# Patient Record
Sex: MALE | Race: WHITE | NOT HISPANIC OR LATINO | Employment: STUDENT | URBAN - METROPOLITAN AREA
[De-identification: names, ages, dates, MRNs, and addresses within clinical notes are randomized per-mention and may not be internally consistent; named-entity substitution may affect disease eponyms.]

---

## 2022-11-02 ENCOUNTER — APPOINTMENT (OUTPATIENT)
Dept: CT IMAGING | Facility: CLINIC | Age: 22
End: 2022-11-02
Attending: EMERGENCY MEDICINE

## 2022-11-02 ENCOUNTER — HOSPITAL ENCOUNTER (OUTPATIENT)
Facility: CLINIC | Age: 22
Setting detail: OBSERVATION
Discharge: HOME | End: 2022-11-03
Attending: EMERGENCY MEDICINE | Admitting: NURSE PRACTITIONER
Payer: COMMERCIAL

## 2022-11-02 DIAGNOSIS — Z20.822 CONTACT WITH AND (SUSPECTED) EXPOSURE TO COVID-19: ICD-10-CM

## 2022-11-02 DIAGNOSIS — N20.0 KIDNEY STONE: ICD-10-CM

## 2022-11-02 DIAGNOSIS — N13.2 URETERAL STONE WITH HYDRONEPHROSIS: ICD-10-CM

## 2022-11-02 DIAGNOSIS — D72.829 LEUKOCYTOSIS, UNSPECIFIED TYPE: ICD-10-CM

## 2022-11-02 LAB
ALBUMIN SERPL BCG-MCNC: 4.4 G/DL (ref 3.5–5.2)
ALBUMIN UR-MCNC: 70 MG/DL
ALP SERPL-CCNC: 54 U/L (ref 35–129)
ALT SERPL W P-5'-P-CCNC: 22 U/L (ref 10–50)
ANION GAP SERPL CALCULATED.3IONS-SCNC: 13 MMOL/L (ref 7–15)
APPEARANCE UR: ABNORMAL
AST SERPL W P-5'-P-CCNC: 48 U/L (ref 10–50)
BILIRUB SERPL-MCNC: 0.3 MG/DL
BILIRUB UR QL STRIP: NEGATIVE
BUN SERPL-MCNC: 9.7 MG/DL (ref 6–20)
CALCIUM SERPL-MCNC: 9.4 MG/DL (ref 8.6–10)
CAOX CRY #/AREA URNS HPF: ABNORMAL /HPF
CHLORIDE SERPL-SCNC: 105 MMOL/L (ref 98–107)
COLOR UR AUTO: ABNORMAL
CREAT SERPL-MCNC: 0.65 MG/DL (ref 0.51–1.17)
DEPRECATED HCO3 PLAS-SCNC: 20 MMOL/L (ref 22–29)
GFR SERPL CREATININE-BSD FRML MDRD: >90 ML/MIN/1.73M2
GLUCOSE SERPL-MCNC: 90 MG/DL (ref 70–99)
GLUCOSE UR STRIP-MCNC: NEGATIVE MG/DL
HGB UR QL STRIP: ABNORMAL
HOLD SPECIMEN: NORMAL
HOLD SPECIMEN: NORMAL
KETONES UR STRIP-MCNC: NEGATIVE MG/DL
LEUKOCYTE ESTERASE UR QL STRIP: NEGATIVE
LIPASE SERPL-CCNC: 69 U/L (ref 13–60)
MUCOUS THREADS #/AREA URNS LPF: PRESENT /LPF
NITRATE UR QL: NEGATIVE
PH UR STRIP: 5.5 [PH] (ref 5–7)
POTASSIUM SERPL-SCNC: 4.4 MMOL/L (ref 3.4–5.3)
PROT SERPL-MCNC: 7.2 G/DL (ref 6.4–8.3)
RBC URINE: >182 /HPF
SODIUM SERPL-SCNC: 138 MMOL/L (ref 136–145)
SP GR UR STRIP: 1.03 (ref 1–1.03)
UROBILINOGEN UR STRIP-MCNC: NORMAL MG/DL
WBC URINE: 2 /HPF

## 2022-11-02 PROCEDURE — 99285 EMERGENCY DEPT VISIT HI MDM: CPT | Performed by: EMERGENCY MEDICINE

## 2022-11-02 PROCEDURE — 83690 ASSAY OF LIPASE: CPT | Performed by: EMERGENCY MEDICINE

## 2022-11-02 PROCEDURE — 74176 CT ABD & PELVIS W/O CONTRAST: CPT

## 2022-11-02 PROCEDURE — 87491 CHLMYD TRACH DNA AMP PROBE: CPT | Performed by: EMERGENCY MEDICINE

## 2022-11-02 PROCEDURE — 99285 EMERGENCY DEPT VISIT HI MDM: CPT | Mod: 25 | Performed by: EMERGENCY MEDICINE

## 2022-11-02 PROCEDURE — C9803 HOPD COVID-19 SPEC COLLECT: HCPCS | Performed by: EMERGENCY MEDICINE

## 2022-11-02 PROCEDURE — 81001 URINALYSIS AUTO W/SCOPE: CPT | Performed by: EMERGENCY MEDICINE

## 2022-11-02 PROCEDURE — 250N000013 HC RX MED GY IP 250 OP 250 PS 637: Performed by: EMERGENCY MEDICINE

## 2022-11-02 PROCEDURE — 36415 COLL VENOUS BLD VENIPUNCTURE: CPT | Performed by: EMERGENCY MEDICINE

## 2022-11-02 PROCEDURE — 87591 N.GONORRHOEAE DNA AMP PROB: CPT | Performed by: EMERGENCY MEDICINE

## 2022-11-02 PROCEDURE — 80053 COMPREHEN METABOLIC PANEL: CPT | Performed by: EMERGENCY MEDICINE

## 2022-11-02 PROCEDURE — 74176 CT ABD & PELVIS W/O CONTRAST: CPT | Mod: 26 | Performed by: RADIOLOGY

## 2022-11-02 RX ORDER — OXYCODONE HYDROCHLORIDE 5 MG/1
5 TABLET ORAL ONCE
Status: COMPLETED | OUTPATIENT
Start: 2022-11-02 | End: 2022-11-02

## 2022-11-02 RX ADMIN — OXYCODONE HYDROCHLORIDE 5 MG: 5 TABLET ORAL at 23:26

## 2022-11-02 ASSESSMENT — ACTIVITIES OF DAILY LIVING (ADL): ADLS_ACUITY_SCORE: 35

## 2022-11-03 VITALS
SYSTOLIC BLOOD PRESSURE: 132 MMHG | WEIGHT: 175 LBS | HEIGHT: 71 IN | OXYGEN SATURATION: 97 % | HEART RATE: 86 BPM | RESPIRATION RATE: 16 BRPM | DIASTOLIC BLOOD PRESSURE: 62 MMHG | TEMPERATURE: 97.9 F | BODY MASS INDEX: 24.5 KG/M2

## 2022-11-03 LAB
ANION GAP SERPL CALCULATED.3IONS-SCNC: 14 MMOL/L (ref 7–15)
BASOPHILS # BLD AUTO: 0 10E3/UL (ref 0–0.2)
BASOPHILS # BLD AUTO: 0 10E3/UL (ref 0–0.2)
BASOPHILS NFR BLD AUTO: 0 %
BASOPHILS NFR BLD AUTO: 0 %
BUN SERPL-MCNC: 12.7 MG/DL (ref 6–20)
CALCIUM SERPL-MCNC: 9.2 MG/DL (ref 8.6–10)
CHLORIDE SERPL-SCNC: 107 MMOL/L (ref 98–107)
CREAT SERPL-MCNC: 0.76 MG/DL (ref 0.51–1.17)
DEPRECATED HCO3 PLAS-SCNC: 18 MMOL/L (ref 22–29)
EOSINOPHIL # BLD AUTO: 0 10E3/UL (ref 0–0.7)
EOSINOPHIL # BLD AUTO: 0 10E3/UL (ref 0–0.7)
EOSINOPHIL NFR BLD AUTO: 0 %
EOSINOPHIL NFR BLD AUTO: 0 %
ERYTHROCYTE [DISTWIDTH] IN BLOOD BY AUTOMATED COUNT: 12.7 % (ref 10–15)
ERYTHROCYTE [DISTWIDTH] IN BLOOD BY AUTOMATED COUNT: 12.8 % (ref 10–15)
GFR SERPL CREATININE-BSD FRML MDRD: >90 ML/MIN/1.73M2
GLUCOSE SERPL-MCNC: 141 MG/DL (ref 70–99)
HCT VFR BLD AUTO: 39.3 % (ref 35–53)
HCT VFR BLD AUTO: 41.2 % (ref 35–53)
HGB BLD-MCNC: 12.9 G/DL (ref 11.7–17.7)
HGB BLD-MCNC: 13.8 G/DL (ref 11.7–17.7)
HOLD SPECIMEN: NORMAL
IMM GRANULOCYTES # BLD: 0.1 10E3/UL
IMM GRANULOCYTES # BLD: 0.1 10E3/UL
IMM GRANULOCYTES NFR BLD: 1 %
IMM GRANULOCYTES NFR BLD: 1 %
LACTATE SERPL-SCNC: 1.5 MMOL/L (ref 0.7–2)
LYMPHOCYTES # BLD AUTO: 1.4 10E3/UL (ref 0.8–5.3)
LYMPHOCYTES # BLD AUTO: 1.9 10E3/UL (ref 0.8–5.3)
LYMPHOCYTES NFR BLD AUTO: 10 %
LYMPHOCYTES NFR BLD AUTO: 17 %
MCH RBC QN AUTO: 30.5 PG (ref 26.5–33)
MCH RBC QN AUTO: 30.7 PG (ref 26.5–33)
MCHC RBC AUTO-ENTMCNC: 32.8 G/DL (ref 31.5–36.5)
MCHC RBC AUTO-ENTMCNC: 33.5 G/DL (ref 31.5–36.5)
MCV RBC AUTO: 92 FL (ref 78–100)
MCV RBC AUTO: 93 FL (ref 78–100)
MONOCYTES # BLD AUTO: 0.5 10E3/UL (ref 0–1.3)
MONOCYTES # BLD AUTO: 0.6 10E3/UL (ref 0–1.3)
MONOCYTES NFR BLD AUTO: 4 %
MONOCYTES NFR BLD AUTO: 6 %
NEUTROPHILS # BLD AUTO: 11.4 10E3/UL (ref 1.6–8.3)
NEUTROPHILS # BLD AUTO: 8.5 10E3/UL (ref 1.6–8.3)
NEUTROPHILS NFR BLD AUTO: 76 %
NEUTROPHILS NFR BLD AUTO: 85 %
NRBC # BLD AUTO: 0 10E3/UL
NRBC # BLD AUTO: 0 10E3/UL
NRBC BLD AUTO-RTO: 0 /100
NRBC BLD AUTO-RTO: 0 /100
PLATELET # BLD AUTO: 247 10E3/UL (ref 150–450)
PLATELET # BLD AUTO: 257 10E3/UL (ref 150–450)
POTASSIUM SERPL-SCNC: 3.9 MMOL/L (ref 3.4–5.3)
RBC # BLD AUTO: 4.23 10E6/UL (ref 3.8–5.9)
RBC # BLD AUTO: 4.5 10E6/UL (ref 3.8–5.9)
SARS-COV-2 RNA RESP QL NAA+PROBE: NEGATIVE
SODIUM SERPL-SCNC: 139 MMOL/L (ref 136–145)
WBC # BLD AUTO: 11 10E3/UL (ref 4–11)
WBC # BLD AUTO: 13.3 10E3/UL (ref 4–11)

## 2022-11-03 PROCEDURE — 99207 PR SERVICE NOT STAFFED W/SUPERV PROV: CPT | Performed by: STUDENT IN AN ORGANIZED HEALTH CARE EDUCATION/TRAINING PROGRAM

## 2022-11-03 PROCEDURE — 96375 TX/PRO/DX INJ NEW DRUG ADDON: CPT

## 2022-11-03 PROCEDURE — 250N000011 HC RX IP 250 OP 636: Performed by: EMERGENCY MEDICINE

## 2022-11-03 PROCEDURE — 36415 COLL VENOUS BLD VENIPUNCTURE: CPT | Performed by: NURSE PRACTITIONER

## 2022-11-03 PROCEDURE — 85004 AUTOMATED DIFF WBC COUNT: CPT | Performed by: NURSE PRACTITIONER

## 2022-11-03 PROCEDURE — 96376 TX/PRO/DX INJ SAME DRUG ADON: CPT

## 2022-11-03 PROCEDURE — 96374 THER/PROPH/DIAG INJ IV PUSH: CPT

## 2022-11-03 PROCEDURE — 99234 HOSP IP/OBS SM DT SF/LOW 45: CPT | Mod: FS | Performed by: EMERGENCY MEDICINE

## 2022-11-03 PROCEDURE — 96361 HYDRATE IV INFUSION ADD-ON: CPT

## 2022-11-03 PROCEDURE — 258N000003 HC RX IP 258 OP 636: Performed by: NURSE PRACTITIONER

## 2022-11-03 PROCEDURE — 250N000013 HC RX MED GY IP 250 OP 250 PS 637: Performed by: NURSE PRACTITIONER

## 2022-11-03 PROCEDURE — 85025 COMPLETE CBC W/AUTO DIFF WBC: CPT | Performed by: EMERGENCY MEDICINE

## 2022-11-03 PROCEDURE — 36415 COLL VENOUS BLD VENIPUNCTURE: CPT | Performed by: EMERGENCY MEDICINE

## 2022-11-03 PROCEDURE — 250N000011 HC RX IP 250 OP 636: Performed by: NURSE PRACTITIONER

## 2022-11-03 PROCEDURE — G0378 HOSPITAL OBSERVATION PER HR: HCPCS

## 2022-11-03 PROCEDURE — U0005 INFEC AGEN DETEC AMPLI PROBE: HCPCS | Performed by: EMERGENCY MEDICINE

## 2022-11-03 PROCEDURE — 82310 ASSAY OF CALCIUM: CPT | Performed by: NURSE PRACTITIONER

## 2022-11-03 PROCEDURE — 83605 ASSAY OF LACTIC ACID: CPT | Performed by: EMERGENCY MEDICINE

## 2022-11-03 RX ORDER — ONDANSETRON 2 MG/ML
4 INJECTION INTRAMUSCULAR; INTRAVENOUS EVERY 4 HOURS PRN
Status: DISCONTINUED | OUTPATIENT
Start: 2022-11-03 | End: 2022-11-03 | Stop reason: HOSPADM

## 2022-11-03 RX ORDER — ACETAMINOPHEN 325 MG/1
975 TABLET ORAL EVERY 6 HOURS
Status: DISCONTINUED | OUTPATIENT
Start: 2022-11-03 | End: 2022-11-03 | Stop reason: HOSPADM

## 2022-11-03 RX ORDER — TAMSULOSIN HYDROCHLORIDE 0.4 MG/1
0.4 CAPSULE ORAL DAILY
Qty: 30 CAPSULE | Refills: 0 | Status: ON HOLD | OUTPATIENT
Start: 2022-11-04 | End: 2022-11-16

## 2022-11-03 RX ORDER — ACETAMINOPHEN 325 MG/1
975 TABLET ORAL EVERY 6 HOURS
COMMUNITY
Start: 2022-11-03 | End: 2022-11-15

## 2022-11-03 RX ORDER — POLYETHYLENE GLYCOL 3350 17 G/17G
17 POWDER, FOR SOLUTION ORAL DAILY PRN
Status: DISCONTINUED | OUTPATIENT
Start: 2022-11-03 | End: 2022-11-03 | Stop reason: HOSPADM

## 2022-11-03 RX ORDER — IBUPROFEN 200 MG
600 TABLET ORAL EVERY 6 HOURS PRN
COMMUNITY
Start: 2022-11-03 | End: 2022-11-15

## 2022-11-03 RX ORDER — POLYETHYLENE GLYCOL 3350 17 G/17G
17 POWDER, FOR SOLUTION ORAL DAILY
Status: DISCONTINUED | OUTPATIENT
Start: 2022-11-03 | End: 2022-11-03

## 2022-11-03 RX ORDER — KETOROLAC TROMETHAMINE 15 MG/ML
15 INJECTION, SOLUTION INTRAMUSCULAR; INTRAVENOUS ONCE
Status: COMPLETED | OUTPATIENT
Start: 2022-11-03 | End: 2022-11-03

## 2022-11-03 RX ORDER — KETOROLAC TROMETHAMINE 15 MG/ML
15-30 INJECTION, SOLUTION INTRAMUSCULAR; INTRAVENOUS EVERY 6 HOURS
Status: DISCONTINUED | OUTPATIENT
Start: 2022-11-03 | End: 2022-11-03 | Stop reason: HOSPADM

## 2022-11-03 RX ORDER — KETOROLAC TROMETHAMINE 15 MG/ML
15 INJECTION, SOLUTION INTRAMUSCULAR; INTRAVENOUS EVERY 6 HOURS
Status: DISCONTINUED | OUTPATIENT
Start: 2022-11-03 | End: 2022-11-03

## 2022-11-03 RX ORDER — ONDANSETRON 2 MG/ML
4 INJECTION INTRAMUSCULAR; INTRAVENOUS ONCE
Status: COMPLETED | OUTPATIENT
Start: 2022-11-03 | End: 2022-11-03

## 2022-11-03 RX ORDER — TAMSULOSIN HYDROCHLORIDE 0.4 MG/1
0.4 CAPSULE ORAL DAILY
Status: DISCONTINUED | OUTPATIENT
Start: 2022-11-03 | End: 2022-11-03 | Stop reason: HOSPADM

## 2022-11-03 RX ORDER — ONDANSETRON 4 MG/1
4 TABLET, ORALLY DISINTEGRATING ORAL EVERY 6 HOURS PRN
Qty: 10 TABLET | Refills: 0 | Status: SHIPPED | OUTPATIENT
Start: 2022-11-03 | End: 2022-11-15

## 2022-11-03 RX ORDER — SODIUM CHLORIDE 9 MG/ML
INJECTION, SOLUTION INTRAVENOUS CONTINUOUS
Status: DISCONTINUED | OUTPATIENT
Start: 2022-11-03 | End: 2022-11-03 | Stop reason: HOSPADM

## 2022-11-03 RX ADMIN — SODIUM CHLORIDE: 9 INJECTION, SOLUTION INTRAVENOUS at 06:03

## 2022-11-03 RX ADMIN — ONDANSETRON 4 MG: 2 INJECTION INTRAMUSCULAR; INTRAVENOUS at 01:29

## 2022-11-03 RX ADMIN — TAMSULOSIN HYDROCHLORIDE 0.4 MG: 0.4 CAPSULE ORAL at 09:04

## 2022-11-03 RX ADMIN — KETOROLAC TROMETHAMINE 15 MG: 15 INJECTION, SOLUTION INTRAMUSCULAR; INTRAVENOUS at 08:30

## 2022-11-03 RX ADMIN — KETOROLAC TROMETHAMINE 15 MG: 15 INJECTION, SOLUTION INTRAMUSCULAR; INTRAVENOUS at 01:30

## 2022-11-03 ASSESSMENT — ACTIVITIES OF DAILY LIVING (ADL)
ADLS_ACUITY_SCORE: 35

## 2022-11-03 NOTE — PROCEDURES
Procedure note:    Peripheral Venous Access with US Guidance   Limited Diagnostic Exam: Venipuncture requiring physician skill with ultrasound guidance    Performed by Audra Hartman CNP  Patient Identity confirmed: Yes  Risks, benefits and alternatives were discussed  Consent given by: patient   Indication for Exam: Vascular Access   Vein/Location: right AC  Normal Compressibility and Visualized Patency   Catheter Size: 18g  Number of Attempts: 1  Successful Catheter Insertion: no, labs (phlebotomy) was able to be performed and labs sent for analysis  Complications: yes, increased swelling around insertion site when flushed IV catheter, resistance felt. Removed PIV.

## 2022-11-03 NOTE — H&P
[facility]    History and Physical - Emergency Department Observation Unit     Date of Admission:  11/2/2022    Assessment & Plan      Regan Gibson is a 22 year old adult admitted on 11/2/2022. Kiran has no past medical history. Regan presents with abdominal pain, back pain and bloody urine.     #4 mm obstructing stone proximal right ureter resulting in minimal right hydronephrosis..  #Leukocytosis  Patient presents with acute right flank pain and noted blood in the urine which started suddenly today. Reported pain radiating to right testicle since hematuria started. No abnormal testicular swelling or abnormal position of testicle which has improved.  In the ED: Vitals:BP:139/83  Pulse: 82 Temp: 97.9 Resp: 17 SP02:100% Labs:Na 138, K 4.4 Cr 0.65, BUN 20 , , BG 69, WBC 13.3, Hgb 13.8, Plts 257, Chlamydia and Gonorrhoeae pending, Covid negative, UA with cloudy appearance, , Large blood , >182 RBC, Negative LE, Negative Nitrite.  Medications:  Ketorolac 15 mg IV x 1, Zofran 4 mg IV x 1, Oxycodone 5 mg PO x 1. Imaging: CT abd/pelvis: 4 mm obstructing stone in the proximal right ureter just below the UPJ resulting in minimal right hydronephrosis. Single 3 mm nonobstructing stone right kidney which is otherwise negative. Left kidney contains 4 nonobstructing stones  measuring up to 4 mm in diameter. No left-sided ureteral stones or hydronephrosis. Bladder is unremarkable Consults: none Plan: Admit to ED observation for pain management. Reports Oxycodone does not help, Toradol has been most helpful.   - Oxycodone 5-10 mg q 4 hours prn, Dilaudid 0.3-0.5 q 3 hours prn for breakthrough pain, Toradol 15 mg q 6 hours, Flomax daily, Scheduled Tylenol.   - Miralax prn  - Continuous IVF  - Strict I and O's  - Strain urine  - ADAT  - Antiemetics prn  - Repeat CBC and CMP in am     Diet:  ADAT   DVT Prophylaxis: Ambulate every shift  Alvarado Catheter: Not present  Central Lines:  "None  Cardiac Monitoring: None  Code Status:   Full    Disposition Plan      Expected Discharge Date: 11/04/2022                The patient's care was discussed with the Bedside Nurse, Patient and ED physician, Dr. Aceves.    IBRAHIMA Hurst CNP  _________________________________________________________________    Chief Complaint   Abdominal pain, back pain and blood in urine     History is obtained from the patient    History of Present Illness   Per ED note, \" Patient developed lower abdominal discomfort, low back discomfort followed by hematuria and a little bit of thicker blood clot.  Symptoms are a bit worse on the right side.  Pain is in low abdomen, little worse on right as well as low back. Does have some pain radiating to right testicle since hematuria started. No abnormal testicular swelling or abnormal position of testicle. No discharge.No fever or chills, no traumas, no known stones or anything like this previously. \"    Review of Systems    The 10 point Review of Systems is negative other than noted in the HPI or here. Lower abdominal pain, back pain and blood in urine.     Past Medical History    I have reviewed this patient's medical history and updated it with pertinent information if needed.   No past medical history on file.    Past Surgical History   I have reviewed this patient's surgical history and updated it with pertinent information if needed.  No past surgical history on file.    Social History   I have reviewed this patient's social history and updated it with pertinent information if needed.       Family History   Prior to Admission Medications   None     Allergies   Allergies   Allergen Reactions     Ibuprofen Nausea       Physical Exam   Vital Signs: Temp: 97.9  F (36.6  C) Temp src: Temporal BP: 139/83 Pulse: 82   Resp: 17 SpO2: 100 % O2 Device: None (Room air)    Weight: 175 lbs 0 oz    Constitutional: healthy, alert and no distress   Head: Normocephalic. No masses, lesions, " tenderness or abnormalities   Neck: Neck supple. No adenopathy. Thyroid symmetric, normal size,, Carotids without bruits.   ENT: ENT exam normal, no neck nodes or sinus tenderness   Cardiovascular: RRR. No murmurs, clicks gallops or rub   Respiratory: . Good diaphragmatic excursion. Lungs clear   Gastrointestinal: Abdomen soft,. BS normal. No masses, organomegaly.   : Deferred   Musculoskeletal: extremities normal- no gross deformities noted, gait normal and normal muscle tone   Skin: no suspicious lesions or rashes   Neurologic: Gait normal. Reflexes normal and symmetric. Sensation grossly WNL.   Psychiatric: mentation appears normal and affect normal/bright     Data   Data reviewed today: I reviewed all medications, new labs and imaging results over the last 24 hours.    Recent Labs   Lab 11/03/22 0119 11/02/22 2141   WBC 13.3*  --    HGB 13.8  --    MCV 92  --      --    NA  --  138   POTASSIUM  --  4.4   CHLORIDE  --  105   CO2  --  20*   BUN  --  9.7   CR  --  0.65   ANIONGAP  --  13   JOLIE  --  9.4   GLC  --  90   ALBUMIN  --  4.4   PROTTOTAL  --  7.2   BILITOTAL  --  0.3   ALKPHOS  --  54   ALT  --  22   AST  --  48   LIPASE  --  69*     Most Recent 3 CBC's:Recent Labs   Lab Test 11/03/22 0119   WBC 13.3*   HGB 13.8   MCV 92        Most Recent 3 BMP's:Recent Labs   Lab Test 11/02/22 2141      POTASSIUM 4.4   CHLORIDE 105   CO2 20*   BUN 9.7   CR 0.65   ANIONGAP 13   JOLIE 9.4   GLC 90     Most Recent 2 LFT's:Recent Labs   Lab Test 11/02/22 2141   AST 48   ALT 22   ALKPHOS 54   BILITOTAL 0.3     Recent Results (from the past 24 hour(s))   CT Abdomen Pelvis w/o Contrast    Narrative    EXAM: CT ABDOMEN PELVIS W/O CONTRAST  LOCATION: [facility]  DATE/TIME: 11/2/2022 10:36 PM    INDICATION: Flank pain, back pain, hematuria.  COMPARISON: None.  TECHNIQUE: CT scan of the abdomen and pelvis was performed without IV contrast. Multiplanar reformats  were obtained. Dose reduction techniques were used.  CONTRAST: None.    FINDINGS:   LOWER CHEST: Normal.    HEPATOBILIARY: Normal.    PANCREAS: Normal.    SPLEEN: Normal.    ADRENAL GLANDS: Normal.    KIDNEYS/BLADDER: 4 mm obstructing stone in the proximal right ureter just below the UPJ resulting in minimal right hydronephrosis. Single 3 mm nonobstructing stone right kidney which is otherwise negative. Left kidney contains 4 nonobstructing stones   measuring up to 4 mm in diameter. No left-sided ureteral stones or hydronephrosis. Bladder is unremarkable.    BOWEL: Normal.    LYMPH NODES: Normal.    VASCULATURE: Unremarkable.    PELVIC ORGANS: Normal.    MUSCULOSKELETAL: Normal.      Impression    IMPRESSION:   1.  4 mm obstructing stone proximal right ureter resulting in minimal right hydronephrosis.    2.  No other acute findings.    3.  Additional nonobstructing intrarenal calculi bilaterally as above.

## 2022-11-03 NOTE — CONSULTS
"Urology Consult History and Physical    Name: Regan \"Dana\" CLAUDIA Gibson    MRN: [id number]   YOB: 2000       We were asked to see Dana Gibson at the request of Dr. Barkley for evaluation and treatment of ureterolithiasis.        Chief Complaint:   Ureterolithiasis    History is obtained from the patient          History of Present Illness:   Regan Gibson is a 22 year old adult, otherwise healthy, who is visiting the [place] from [place] until 01/2023, who is being seen for evaluation of ureterolithiasis.      Per ED note, \" Patient developed lower abdominal discomfort, low back discomfort followed by hematuria and a little bit of thicker blood clot.  Symptoms are a bit worse on the right side.  Pain is in low abdomen, little worse on right as well as low back. Does have some pain radiating to right testicle since hematuria started. No abnormal testicular swelling or abnormal position of testicle. No discharge. No fever or chills, no traumas, no known stones or anything like this previously.\"    WBC 13.3 -> 11, Cr WNL, UA with RBC, no nitrites/leuk est/bacteria. CT scan with 4mm stone in R proximal ureter with mild upstream hydro, as well as bilateral small non-obstructing stones. Started on IVF, Flomax, pain control, given urine strainer.     Upon talking to patient this AM, her flank/abd pain was much improved, had received one dose of Toradol around 1:30AM, and no further pain medications. Showed me urine strainer with small white speck that she thought might have been her stone. Denies personal history of stones, but her father has had multiple stones in the past.            Past Medical History:   No past medical history on file.         Past Surgical History:   No past surgical history on file.         Social History:     Social History     Tobacco Use     Smoking status: Not on file     Smokeless tobacco: Not on file   Substance Use Topics     Alcohol use: Not on file            Family History:   No " "family history on file.           Allergies:     Allergies   Allergen Reactions     Ibuprofen Nausea            Medications:     Current Facility-Administered Medications   Medication     acetaminophen (TYLENOL) tablet 975 mg     ketorolac (TORADOL) injection 15-30 mg     melatonin tablet 1 mg     ondansetron (ZOFRAN) injection 4 mg     polyethylene glycol (MIRALAX) Packet 17 g     prochlorperazine (COMPAZINE) injection 5 mg     sodium chloride 0.9% infusion     tamsulosin (FLOMAX) capsule 0.4 mg     No current outpatient medications on file.             Review of Systems:    ROS: 10 point ROS neg other than the symptoms noted above in the HPI.          Physical Exam:     Patient Vitals for the past 24 hrs:   BP Temp Temp src Pulse Resp SpO2 Height Weight   11/03/22 0136 139/83 -- -- 82 17 100 % -- --   11/02/22 2136 123/71 97.9  F (36.6  C) Temporal 85 18 100 % 1.803 m (5' 11\") 79.4 kg (175 lb)     General: age-appropriate appearing adult in NAD  HEENT: Head AT/NC, EOMI, CN Grossly intact  Lungs: no respiratory distress, or pursed lip breathing  Back: no CVAT bilaterally.  Abdomen: soft, non-distended, non-tender  Musculoskeletal: extremities normal, no peripheral edema  Skin: no suspicious lesions or rashes  Neuro: Alert, oriented, speech and mentation normal  Psych: affect and mood normal          Data:   All laboratory data reviewed:    Recent Labs   Lab 11/03/22  0119   WBC 13.3*   HGB 13.8        Recent Labs   Lab 11/02/22  2141      POTASSIUM 4.4   CHLORIDE 105   CO2 20*   BUN 9.7   CR 0.65   GLC 90   JOLIE 9.4     Recent Labs   Lab 11/02/22  2143   COLOR Orange*   APPEARANCE Cloudy*   URINEGLC Negative   URINEBILI Negative   URINEKETONE Negative   SG 1.027   URINEPH 5.5   PROTEIN 70*   NITRITE Negative   LEUKEST Negative   RBCU >182*   WBCU 2     All pertinent imaging reviewed:  CT A/P non-con -   IMPRESSION:   1.  4 mm obstructing stone proximal right ureter resulting in minimal right " hydronephrosis.     2.  No other acute findings.     3.  Additional nonobstructing intrarenal calculi bilaterally as above.         Impression and Plan:   Impression:   22yoF with R proximal ureteral stone, 4mm in size, with minimal R hydro. Non-toxic appearing, not infected, pain controlled. Thinks she may have passed the stone since admission overnight.     Reviewed MET as recommended treatment for 4mm stone, including hydration, pain control, Flomax, and urine straining. Discussed that, for patients discharged on MET, the typical recommendation is to obtain f/u imaging to check for stone passage at some point in the future. Patient stated that she would prefer to do this once she returns to [place] in January, as she does not have healthcare coverage here. Reviewed return precautions and briefly reviewed other surgical treatment options for stones.       Plan:   - medical expulsive therapy   - strict return precautions  - f/u imaging (renal ultrasound) in 01/2023 when she returns home      Discussed with chief resident and staff on call, Dr. Oswald.     Inessa Sarkar MD  Urology Resident, PGY-2

## 2022-11-03 NOTE — DISCHARGE SUMMARY
Discharge Summary    Regan Gibson MRN# [id number]   YOB: 2000 Age: 22 year old     Date of Admission:  11/2/2022  Date of Discharge:  11/3/2022 12:38 PM  Admitting Physician:  IBRAHIMA Soliman CNP  Discharge Physician:  Gisselle Castillo PA-C  Discharging Service:  Internal Medicine     Primary Provider: No Ref-Primary, Physician          Discharge Diagnosis:   4 mm obstructing right proximal ureteral stone   Right hydronephrosis              Discharge Disposition:   Discharged to home           Condition on Discharge:   Discharge condition: Stable   Code status on discharge: Full Code           Procedures:   Imaging performed:   Abdomen CT             Discharge Medications:     Current Discharge Medication List      START taking these medications    Details   acetaminophen (TYLENOL) 325 MG tablet Take 3 tablets (975 mg) by mouth every 6 hours      ondansetron (ZOFRAN ODT) 4 MG ODT tab Take 1 tablet (4 mg) by mouth every 6 hours as needed for nausea  Qty: 10 tablet, Refills: 0    Associated Diagnoses: Kidney stone      tamsulosin (FLOMAX) 0.4 MG capsule Take 1 capsule (0.4 mg) by mouth daily for 30 days  Qty: 30 capsule, Refills: 0    Associated Diagnoses: Kidney stone         CONTINUE these medications which have NOT CHANGED    Details   ibuprofen (ADVIL/MOTRIN) 200 MG tablet Take 3 tablets (600 mg) by mouth every 6 hours as needed for pain    Comments: Take this medication with meals                   Consultations:   Consultation during this admission received from urology             Brief History of Illness:   Regan Gibson is a 22 year old adult admitted on 11/2/2022. Kiran has no past medical history. Regan presents with abdominal pain, back pain and bloody urine.           Hospital Course:   #4 mm obstructing stone proximal right ureter resulting in minimal right hydronephrosis..  #Leukocytosis, resolved    Patient presents with acute right flank pain and noted blood in the urine which  "started suddenly today. Reported pain radiating to right testicle since hematuria started. No abnormal testicular swelling or abnormal position of testicle which has improved.  In the ED: Vitals:BP:139/83  Pulse: 82 Temp: 97.9 Resp: 17 SP02:100% Labs:Na 138, K 4.4 Cr 0.65, BUN 20 , , BG 69, WBC 13.3, Hgb 13.8, Plts 257, Chlamydia and Gonorrhoeae pending, Covid negative, UA with cloudy appearance, , Large blood , >182 RBC, Negative LE, Negative Nitrite. CT abd/pelvis showed 4 mm obstructing stone in the proximal right ureter just below the UPJ resulting in minimal right hydronephrosis. Single 3 mm nonobstructing stone right kidney which is otherwise negative. Left kidney contains 4 nonobstructing stones  measuring up to 4 mm in diameter. No left-sided ureteral stones or hydronephrosis. Bladder is unremarkable. Patient feeling medically improved this morning. Rates pain 3/10. She is able to tolerate po. Leukocytosis resolved with recheck blood work this morning. No s&s of systemic infection process. Urology was consulted. No surgical intervention recommended given that the patient has minimal pain and is hemodynamically stable. They recommend conservative management. Patient discharged home with Flomax. Advised to continue to strain urine for stone. They also recommend f/u imaging to check for stone passage at some point when the patient returns home. Patient discharged in HD sable condition.   - Tylenol PRN  - Ibuprofen 600 mg q 6hr PRN  - Flomax 0.4 mg daily  - Continue to strain urine  - Zofran 4 mg ODT q 6hr prn                   Final Day of Progress before Discharge:       Physical Exam:  Blood pressure 132/62, pulse 86, temperature 97.9  F (36.6  C), temperature source Temporal, resp. rate 16, height 1.803 m (5' 11\"), weight 79.4 kg (175 lb), SpO2 97 %.    EXAM:  Physical Exam   Constitutional: Pt is oriented to person, place, and time.Pt appears well-developed and well-nourished.   HENT:   Head: Normocephalic " and atraumatic.   Eyes: Conjunctivae are normal. Pupils are equal, round, and reactive to light.   Neck: Normal range of motion. Neck supple.   Cardiovascular: Normal rate, regular rhythm, normal heart sounds and intact distal pulses.    Pulmonary/Chest: Effort normal and breath sounds normal. No respiratory distress. Pt has no wheezes. Pt has no rales  Abdominal: Soft. Bowel sounds are normal. Pt exhibits no distension and no mass. No tenderness. Pt has no rebound and no guarding.   Musculoskeletal: Normal range of motion. Pt exhibits no edema.   Neurological: Pt is alert and oriented to person, place, and time. Normal reflexes.   Skin: Skin is warm and dry. No rash noted.   Psychiatric: Pt has a normal mood and affect. Behavior is normal. Judgment and thought content normal.             Data:  All laboratory data reviewed             Significant Results:   None  Results for orders placed or performed during the hospital encounter of 11/02/22   CT Abdomen Pelvis w/o Contrast     Status: None    Narrative    EXAM: CT ABDOMEN PELVIS W/O CONTRAST  LOCATION: [facility]  DATE/TIME: 11/2/2022 10:36 PM    INDICATION: Flank pain, back pain, hematuria.  COMPARISON: None.  TECHNIQUE: CT scan of the abdomen and pelvis was performed without IV contrast. Multiplanar reformats were obtained. Dose reduction techniques were used.  CONTRAST: None.    FINDINGS:   LOWER CHEST: Normal.    HEPATOBILIARY: Normal.    PANCREAS: Normal.    SPLEEN: Normal.    ADRENAL GLANDS: Normal.    KIDNEYS/BLADDER: 4 mm obstructing stone in the proximal right ureter just below the UPJ resulting in minimal right hydronephrosis. Single 3 mm nonobstructing stone right kidney which is otherwise negative. Left kidney contains 4 nonobstructing stones   measuring up to 4 mm in diameter. No left-sided ureteral stones or hydronephrosis. Bladder is unremarkable.    BOWEL: Normal.    LYMPH NODES:  Normal.    VASCULATURE: Unremarkable.    PELVIC ORGANS: Normal.    MUSCULOSKELETAL: Normal.      Impression    IMPRESSION:   1.  4 mm obstructing stone proximal right ureter resulting in minimal right hydronephrosis.    2.  No other acute findings.    3.  Additional nonobstructing intrarenal calculi bilaterally as above.     Comprehensive metabolic panel     Status: Abnormal   Result Value Ref Range    Sodium 138 136 - 145 mmol/L    Potassium 4.4 3.4 - 5.3 mmol/L    Chloride 105 98 - 107 mmol/L    Carbon Dioxide (CO2) 20 (L) 22 - 29 mmol/L    Anion Gap 13 7 - 15 mmol/L    Urea Nitrogen 9.7 6.0 - 20.0 mg/dL    Creatinine 0.65 0.51 - 1.17 mg/dL    Calcium 9.4 8.6 - 10.0 mg/dL    Glucose 90 70 - 99 mg/dL    Alkaline Phosphatase 54 35 - 129 U/L    AST 48 10 - 50 U/L    ALT 22 10 - 50 U/L    Protein Total 7.2 6.4 - 8.3 g/dL    Albumin 4.4 3.5 - 5.2 g/dL    Bilirubin Total 0.3 <=1.2 mg/dL    GFR Estimate >90 >60 mL/min/1.73m2    Narrative    The sex of this patient cannot be reliably determined based on discrepancies in demographics (legal sex, sex assigned at birth, gender identity).  Both male and female reference ranges are provided where applicable.  Careful evaluation of the patient s results as compared to the gender specific reference intervals is required in this setting.    UA with Microscopic reflex to Culture     Status: Abnormal    Specimen: Urine, Midstream   Result Value Ref Range    Color Urine Orange (A) Colorless, Straw, Light Yellow, Yellow    Appearance Urine Cloudy (A) Clear    Glucose Urine Negative Negative mg/dL    Bilirubin Urine Negative Negative    Ketones Urine Negative Negative mg/dL    Specific Gravity Urine 1.027 1.003 - 1.035    Blood Urine Large (A) Negative    pH Urine 5.5 5.0 - 7.0    Protein Albumin Urine 70 (A) Negative mg/dL    Urobilinogen Urine Normal Normal, 2.0 mg/dL    Nitrite Urine Negative Negative    Leukocyte Esterase Urine Negative Negative    Mucus Urine Present (A) None  Seen /LPF    Calcium Oxalate Crystals Urine Few (A) None Seen /HPF    RBC Urine >182 (H) <=2 /HPF    WBC Urine 2 <=5 /HPF    Narrative    Urine Culture not indicated   [place] Draw     Status: None    Narrative    The following orders were created for panel order [place] Draw.  Procedure                               Abnormality         Status                     ---------                               -----------         ------                     Extra Blue Top Tube[[id number]]                                                         Extra Red Top Tube[[id number]]                               Final result               Extra Green Top (Lithium...[[id number]]                      Final result               Extra Purple Top Tube[[id number]]                            Final result                 Please view results for these tests on the individual orders.   CBC with platelets and differential     Status: Abnormal   Result Value Ref Range    WBC Count 13.3 (H) 4.0 - 11.0 10e3/uL    RBC Count 4.50 3.80 - 5.90 10e6/uL    Hemoglobin 13.8 11.7 - 17.7 g/dL    Hematocrit 41.2 35.0 - 53.0 %    MCV 92 78 - 100 fL    MCH 30.7 26.5 - 33.0 pg    MCHC 33.5 31.5 - 36.5 g/dL    RDW 12.7 10.0 - 15.0 %    Platelet Count 257 150 - 450 10e3/uL    % Neutrophils 85 %    % Lymphocytes 10 %    % Monocytes 4 %    % Eosinophils 0 %    % Basophils 0 %    % Immature Granulocytes 1 %    NRBCs per 100 WBC 0 <1 /100    Absolute Neutrophils 11.4 (H) 1.6 - 8.3 10e3/uL    Absolute Lymphocytes 1.4 0.8 - 5.3 10e3/uL    Absolute Monocytes 0.5 0.0 - 1.3 10e3/uL    Absolute Eosinophils 0.0 0.0 - 0.7 10e3/uL    Absolute Basophils 0.0 0.0 - 0.2 10e3/uL    Absolute Immature Granulocytes 0.1 <=0.4 10e3/uL    Absolute NRBCs 0.0 10e3/uL    Narrative    The sex of this patient cannot be reliably determined based on discrepancies in demographics (legal sex, sex assigned at birth, gender identity).  Both male and female reference ranges are provided where  applicable.  Careful evaluation of the patient s results as compared to the gender specific reference intervals is required in this setting.    Extra Red Top Tube     Status: None   Result Value Ref Range    Hold Specimen JIC    Extra Green Top (Lithium Heparin) Tube     Status: None   Result Value Ref Range    Hold Specimen JIC    Extra Purple Top Tube     Status: None   Result Value Ref Range    Hold Specimen JIC    Lactic acid whole blood     Status: Normal   Result Value Ref Range    Lactic Acid 1.5 0.7 - 2.0 mmol/L   Lipase     Status: Abnormal   Result Value Ref Range    Lipase 69 (H) 13 - 60 U/L   Asymptomatic COVID-19 Virus (Coronavirus) by PCR Nasopharyngeal     Status: Normal    Specimen: Nasopharyngeal; Swab   Result Value Ref Range    SARS CoV2 PCR Negative Negative    Narrative    Testing was performed using the [employer] Xpress SARS-CoV-2 Assay on the  Cepheid Gene-Xpert Instrument Systems. Additional information about  this Emergency Use Authorization (EUA) assay can be found via the Lab  Guide. This test should be ordered for the detection of SARS-CoV-2 in  individuals who meet SARS-CoV-2 clinical and/or epidemiological  criteria. Test performance is unknown in asymptomatic patients. This  test is for in vitro diagnostic use under the FDA EUA for  laboratories certified under CLIA to perform high complexity testing.  This test has not been FDA cleared or approved. A negative result  does not rule out the presence of PCR inhibitors in the specimen or  target RNA in concentration below the limit of detection for the  assay. The possibility of a false negative should be considered if  the patient's recent exposure or clinical presentation suggests  COVID-19. This test was validated by the [facility]. This laboratory is certified under  the Clinical Laboratory Improvement Amendments of 1988 (CLIA-88) as  qualified to perform high complexity laboratory  testing.     Basic metabolic panel     Status: Abnormal   Result Value Ref Range    Sodium 139 136 - 145 mmol/L    Potassium 3.9 3.4 - 5.3 mmol/L    Chloride 107 98 - 107 mmol/L    Carbon Dioxide (CO2) 18 (L) 22 - 29 mmol/L    Anion Gap 14 7 - 15 mmol/L    Urea Nitrogen 12.7 6.0 - 20.0 mg/dL    Creatinine 0.76 0.51 - 1.17 mg/dL    Calcium 9.2 8.6 - 10.0 mg/dL    Glucose 141 (H) 70 - 99 mg/dL    GFR Estimate >90 >60 mL/min/1.73m2    Narrative    The sex of this patient cannot be reliably determined based on discrepancies in demographics (legal sex, sex assigned at birth, gender identity).  Both male and female reference ranges are provided where applicable.  Careful evaluation of the patient s results as compared to the gender specific reference intervals is required in this setting.    CBC with platelets and differential     Status: Abnormal   Result Value Ref Range    WBC Count 11.0 4.0 - 11.0 10e3/uL    RBC Count 4.23 3.80 - 5.90 10e6/uL    Hemoglobin 12.9 11.7 - 17.7 g/dL    Hematocrit 39.3 35.0 - 53.0 %    MCV 93 78 - 100 fL    MCH 30.5 26.5 - 33.0 pg    MCHC 32.8 31.5 - 36.5 g/dL    RDW 12.8 10.0 - 15.0 %    Platelet Count 247 150 - 450 10e3/uL    % Neutrophils 76 %    % Lymphocytes 17 %    % Monocytes 6 %    % Eosinophils 0 %    % Basophils 0 %    % Immature Granulocytes 1 %    NRBCs per 100 WBC 0 <1 /100    Absolute Neutrophils 8.5 (H) 1.6 - 8.3 10e3/uL    Absolute Lymphocytes 1.9 0.8 - 5.3 10e3/uL    Absolute Monocytes 0.6 0.0 - 1.3 10e3/uL    Absolute Eosinophils 0.0 0.0 - 0.7 10e3/uL    Absolute Basophils 0.0 0.0 - 0.2 10e3/uL    Absolute Immature Granulocytes 0.1 <=0.4 10e3/uL    Absolute NRBCs 0.0 10e3/uL    Narrative    The sex of this patient cannot be reliably determined based on discrepancies in demographics (legal sex, sex assigned at birth, gender identity).  Both male and female reference ranges are provided where applicable.  Careful evaluation of the patient s results as compared to the gender  specific reference intervals is required in this setting.    CBC with platelets differential     Status: Abnormal    Narrative    The following orders were created for panel order CBC with platelets differential.  Procedure                               Abnormality         Status                     ---------                               -----------         ------                     CBC with platelets and d...[[id number]]  Abnormal            Final result                 Please view results for these tests on the individual orders.   CBC with platelets differential     Status: Abnormal    Narrative    The following orders were created for panel order CBC with platelets differential.  Procedure                               Abnormality         Status                     ---------                               -----------         ------                     CBC with platelets and d...[[id number]]  Abnormal            Final result                 Please view results for these tests on the individual orders.      Recent Results (from the past 48 hour(s))   CT Abdomen Pelvis w/o Contrast    Narrative    EXAM: CT ABDOMEN PELVIS W/O CONTRAST  LOCATION: [facility]  DATE/TIME: 11/2/2022 10:36 PM    INDICATION: Flank pain, back pain, hematuria.  COMPARISON: None.  TECHNIQUE: CT scan of the abdomen and pelvis was performed without IV contrast. Multiplanar reformats were obtained. Dose reduction techniques were used.  CONTRAST: None.    FINDINGS:   LOWER CHEST: Normal.    HEPATOBILIARY: Normal.    PANCREAS: Normal.    SPLEEN: Normal.    ADRENAL GLANDS: Normal.    KIDNEYS/BLADDER: 4 mm obstructing stone in the proximal right ureter just below the UPJ resulting in minimal right hydronephrosis. Single 3 mm nonobstructing stone right kidney which is otherwise negative. Left kidney contains 4 nonobstructing stones   measuring up to 4 mm in diameter. No left-sided ureteral stones or  hydronephrosis. Bladder is unremarkable.    BOWEL: Normal.    LYMPH NODES: Normal.    VASCULATURE: Unremarkable.    PELVIC ORGANS: Normal.    MUSCULOSKELETAL: Normal.      Impression    IMPRESSION:   1.  4 mm obstructing stone proximal right ureter resulting in minimal right hydronephrosis.    2.  No other acute findings.    3.  Additional nonobstructing intrarenal calculi bilaterally as above.                  Pending Results:   Unresulted Labs Ordered in the Past 30 Days of this Admission     Date and Time Order Name Status Description    11/2/2022  7:56 PM Neisseria gonorrhoea PCR In process     11/2/2022  7:56 PM Chlamydia trachomatis PCR In process                   Discharge Instructions and Follow-Up:     Discharge Procedure Orders   Reason for your hospital stay   Order Comments: Kidney stone     Activity   Order Comments: Your activity upon discharge: activity as tolerated     Order Specific Question Answer Comments   Is discharge order? Yes      Follow Up and recommended labs and tests   Order Comments: Follow up with urology as discussed     When to contact your care team   Order Comments: Return to the ED with fever, uncontrolled nausea, vomiting, unrelieved pain, dizziness, chest pain, shortness of breath, loss of consciousness, and any new or concerning symptoms.     Discharge Instructions   Order Comments: You were admitted for right flank pain and was found to have a kidney stone by CT scan of the abdomen. Your case was reviewed by urology. They recommend medication management with pain medication and anti-nausea medication at this point. Continue to strain urine at home. If you pass the pass the stone, please bring to [place] acute care lab for analysis. You can take Tylenol and Ibuprofen for pain management. You will be discharged with Zofran for nausea, take it as needed. Follow up with urology outpatient as discussed.     Full Code     Order Specific Question Answer Comments   Code status  determined by: Discussion with patient/ legal decision maker      Diet   Order Comments: Follow this diet upon discharge: Regular     Order Specific Question Answer Comments   Is discharge order? Yes           Attestation:  Gisselle ALMA Castillo PA-C.

## 2022-11-03 NOTE — ED PROVIDER NOTES
"    [place] EMERGENCY DEPARTMENT ([facility])  11/02/22   History     Chief Complaint   Patient presents with     Abdominal Pain     Back Pain     Hematuria     The history is provided by the patient.     Regan \"Dana\" Paige is a 22 year old, visiting from [place] (new to the [facility] system and has no past medical records here), who presents with abdominal pain, back pain and bloody urine.      CURRENT PRESENTATION:  Patient developed atraumatic lower abdominal discomfort, and atraumatic low back discomfort followed by hematuria and a little bit of thicker blood clot today. Symptoms are a bit worse on the right side.  Pain is in low abdomen, little worse on right as well as low back. Does have some pain radiating to right testicle since hematuria started. No abnormal testicular swelling or abnormal position of testicle. No discharge.No fever or chills, no traumas, no known hx of stones or anything like this previously. No other symptoms or complaints at this time. Please see ROS for further details.        I have reviewed the Medications, Allergies, Past Medical and Surgical History, and Social History in the Epic system.  No past medical history on file. None pertinent reported. Confirmed.    No past surgical history on file. None pertinent reported. Confirmed.   Past medical history, past surgical history, medications, and allergies were reviewed with the patient.     No family history on file.    Social History     Tobacco Use     Smoking status: Not on file     Smokeless tobacco: Not on file   Substance Use Topics     Alcohol use: Not on file      Social history was reviewed with the patient.     Review of Systems   Constitutional: Negative for fever.   Respiratory: Negative for cough and shortness of breath.    Cardiovascular: Negative.    Gastrointestinal: Positive for abdominal pain (right), nausea and vomiting. Negative for blood in stool, constipation and diarrhea.   Genitourinary: " "Positive for hematuria. Negative for dysuria, frequency, penile discharge, penile pain, penile swelling, scrotal swelling, testicular pain and urgency.   Musculoskeletal: Positive for back pain (low, right). Negative for neck pain and neck stiffness.   Skin: Negative.    Allergic/Immunologic: Negative for immunocompromised state.   Neurological: Negative for syncope, weakness and light-headedness.   All other systems reviewed and are negative.       A complete review of systems was performed with pertinent positives and negatives noted in the HPI, and all other systems negative.    Physical Exam   BP: 123/71  Pulse: 85  Temp: 97.9  F (36.6  C)  Resp: 18  Height: 180.3 cm (5' 11\")  Weight: 79.4 kg (175 lb)  SpO2: 100 %      CONSTITUTIONAL: Well-developed and well-nourished. Awake and alert. Non-toxic appearance. No acute distress.   HENT:   - Head: Normocephalic and atraumatic.   - Ears: Hearing and external ear grossly normal.   - Nose: Nose normal. No rhinorrhea. No epistaxis.   - Mouth/Throat: MMM  EYES: Conjunctivae and lids are normal. No scleral icterus.   NECK: Normal range of motion and phonation normal. Neck supple.  No tracheal deviation, no stridor. No edema or erythema noted.  CARDIOVASCULAR: Normal rate, regular rhythm and no appreciable abnormal heart sounds.  PULMONARY/CHEST: Normal work of breathing. No accessory muscle usage or stridor. No respiratory distress.  No appreciable abnormal breath sounds.  ABDOMEN: Soft, non-distended. No tenderness. No rigidity, rebound or guarding.   MUSCULOSKELETAL: Extremities warm and seemingly well perfused. No edema or calf tenderness.  NEUROLOGIC: Awake, alert. Not disoriented. No atrophy and no tremor. Normal tone. No seizure activity. GCS 15  SKIN: Skin is warm and dry. No rash noted. No diaphoresis. No pallor.   PSYCHIATRIC: Normal mood and affect. Speech and behavior normal. Thought processes linear. Cognition and memory are normal.      ED Course     ED " "Course as of 11/03/22 0524   Wed Nov 02, 2022 2153 Patient vagal'ed during blood draw, didn't pass out, felt briefly unwell and improved spontaneously within minutes. Patient states they have a consistent history of passing out/near passing out with blood draws. No chest pain or breathing symptoms.    2205 Rechecked on patient, they are completely back to normal. Took 2 Tylenol prior to arrival, currently declining further pain medication.          Assessments & Plan (with Medical Decision Making)   IMPRESSION: 22 year old, visiting from [place], presenting w/ abdominal, back, flank pain, hematuria as described above.  Clinically, patient appears intermittently uncomfortable, has had some vomiting here. Otherwise on examination, Has some mild RLQ discomfort, Right flank pain. No midline/bony spine discomfort.     Ddx includes, but not limited to, kidney stone (which I think to be most likely), UTI/pyelo, less likely other  or primary testicular cause, hernia, appendicitis, mesenteric adenopathy, et al.     PLAN: Labs, urine studies, CT, symptom management, dispo pending ED Course  - Risks/benefits of pursuing imaging review and accepted.     RESULTS:  - Labs: Normal Cr  - Urine: Does have hematuria, no apparent UTI  - Imaging: Written preliminary reports reviewed:  --- CT A/P: \"1.  4 mm obstructing stone proximal right ureter resulting in minimal right hydronephrosis.  2.  No other acute findings.  3.  Additional nonobstructing intrarenal calculi bilaterally as above.\"  --- Results/reports reviewed w/ patient who expresses understanding of findings and F/U recommendations.    INTERVENTIONS:   - IVF  - Zofran  - Oxycodone  - Toradol    RE-EVALUATION:  - During ED course, patient did develop some vomiting w/ pain, additional pain medications ordered. No rooms w/in ED yet available, and so limiting options somewhat  - Pt otherwise continues to do well here in the ED, no acute issues or apparent concerning changes " in vitals or clinical appearance.    DISCUSSIONS:  - w/ ED Obs: Reviewed patient/presentation, current state of workup/any pending studies. They will admit for further evaluation/management, F/U pending studies as needed, coordinate w/ consulting services as needed. No additional requests/recommendations for workup/management for in the ED at this time.   - w/ Patient: I have reviewed the available findings, plan with the patient. She expressed understanding and agreement with this plan. All questions answered to the best of our ability at this time.       DISPOSITION/PLANNING:  - IMPRESSION: 4mm Kidney stone w/ hydronephrosis (but no UTI and normal renal function), flank pain, vomiting  - DISPOSITION: Admit to ED Obs for further evaluation/management, Uro consult as needed  - RECOMMENDATIONS: F/U pending studies, Symptom management, Uro consult as needed      ______________________________________________________________________         CHRISTOPHER COTA MD    11/2/2022   [facility] EMERGENCY DEPARTMENT     Christopher Cota MD  11/04/22 [id number]

## 2022-11-04 LAB
C TRACH DNA SPEC QL NAA+PROBE: NEGATIVE
N GONORRHOEA DNA SPEC QL NAA+PROBE: NEGATIVE

## 2022-11-04 ASSESSMENT — ENCOUNTER SYMPTOMS
COUGH: 0
VOMITING: 1
BACK PAIN: 1
NECK STIFFNESS: 0
NAUSEA: 1
FEVER: 0
NECK PAIN: 0
BLOOD IN STOOL: 0
WEAKNESS: 0
LIGHT-HEADEDNESS: 0
CARDIOVASCULAR NEGATIVE: 1
SHORTNESS OF BREATH: 0
HEMATURIA: 1
ABDOMINAL PAIN: 1
DIARRHEA: 0
FREQUENCY: 0
CONSTIPATION: 0
DYSURIA: 0

## 2022-11-08 ENCOUNTER — HOSPITAL ENCOUNTER (OUTPATIENT)
Facility: CLINIC | Age: 22
Setting detail: OBSERVATION
Discharge: HOME | End: 2022-11-09
Attending: EMERGENCY MEDICINE | Admitting: PHYSICIAN ASSISTANT
Payer: COMMERCIAL

## 2022-11-08 ENCOUNTER — APPOINTMENT (OUTPATIENT)
Dept: CT IMAGING | Facility: CLINIC | Age: 22
End: 2022-11-08
Attending: EMERGENCY MEDICINE

## 2022-11-08 DIAGNOSIS — N20.0 KIDNEY STONE: ICD-10-CM

## 2022-11-08 DIAGNOSIS — N20.1 RIGHT URETERAL STONE: ICD-10-CM

## 2022-11-08 DIAGNOSIS — Z20.822 CONTACT WITH AND (SUSPECTED) EXPOSURE TO COVID-19: ICD-10-CM

## 2022-11-08 DIAGNOSIS — N13.2 HYDRONEPHROSIS WITH RENAL AND URETERAL CALCULUS OBSTRUCTION: ICD-10-CM

## 2022-11-08 PROCEDURE — 80053 COMPREHEN METABOLIC PANEL: CPT | Performed by: EMERGENCY MEDICINE

## 2022-11-08 PROCEDURE — 96374 THER/PROPH/DIAG INJ IV PUSH: CPT | Performed by: EMERGENCY MEDICINE

## 2022-11-08 PROCEDURE — 85025 COMPLETE CBC W/AUTO DIFF WBC: CPT | Performed by: EMERGENCY MEDICINE

## 2022-11-08 PROCEDURE — 74176 CT ABD & PELVIS W/O CONTRAST: CPT

## 2022-11-08 PROCEDURE — 96375 TX/PRO/DX INJ NEW DRUG ADDON: CPT | Performed by: EMERGENCY MEDICINE

## 2022-11-08 PROCEDURE — 99285 EMERGENCY DEPT VISIT HI MDM: CPT | Performed by: EMERGENCY MEDICINE

## 2022-11-08 PROCEDURE — 74176 CT ABD & PELVIS W/O CONTRAST: CPT | Mod: 26 | Performed by: RADIOLOGY

## 2022-11-08 PROCEDURE — 96361 HYDRATE IV INFUSION ADD-ON: CPT | Performed by: EMERGENCY MEDICINE

## 2022-11-08 PROCEDURE — 36415 COLL VENOUS BLD VENIPUNCTURE: CPT | Performed by: EMERGENCY MEDICINE

## 2022-11-08 PROCEDURE — 99285 EMERGENCY DEPT VISIT HI MDM: CPT | Mod: 25 | Performed by: EMERGENCY MEDICINE

## 2022-11-08 PROCEDURE — 81001 URINALYSIS AUTO W/SCOPE: CPT | Performed by: EMERGENCY MEDICINE

## 2022-11-08 PROCEDURE — 258N000003 HC RX IP 258 OP 636: Performed by: EMERGENCY MEDICINE

## 2022-11-08 PROCEDURE — 250N000011 HC RX IP 250 OP 636: Performed by: EMERGENCY MEDICINE

## 2022-11-08 RX ORDER — SODIUM CHLORIDE 9 MG/ML
INJECTION, SOLUTION INTRAVENOUS CONTINUOUS
Status: DISCONTINUED | OUTPATIENT
Start: 2022-11-09 | End: 2022-11-09 | Stop reason: HOSPADM

## 2022-11-08 RX ORDER — ONDANSETRON 2 MG/ML
4 INJECTION INTRAMUSCULAR; INTRAVENOUS EVERY 30 MIN PRN
Status: DISCONTINUED | OUTPATIENT
Start: 2022-11-08 | End: 2022-11-09 | Stop reason: HOSPADM

## 2022-11-08 RX ORDER — KETOROLAC TROMETHAMINE 15 MG/ML
15 INJECTION, SOLUTION INTRAMUSCULAR; INTRAVENOUS ONCE
Status: COMPLETED | OUTPATIENT
Start: 2022-11-08 | End: 2022-11-08

## 2022-11-08 RX ADMIN — KETOROLAC TROMETHAMINE 15 MG: 15 INJECTION, SOLUTION INTRAMUSCULAR; INTRAVENOUS at 23:58

## 2022-11-08 RX ADMIN — SODIUM CHLORIDE 1000 ML: 9 INJECTION, SOLUTION INTRAVENOUS at 23:57

## 2022-11-08 RX ADMIN — ONDANSETRON 4 MG: 2 INJECTION INTRAMUSCULAR; INTRAVENOUS at 23:58

## 2022-11-09 ENCOUNTER — ANESTHESIA (OUTPATIENT)
Dept: SURGERY | Facility: CLINIC | Age: 22
End: 2022-11-09

## 2022-11-09 ENCOUNTER — PRE VISIT (OUTPATIENT)
Dept: UROLOGY | Facility: CLINIC | Age: 22
End: 2022-11-09

## 2022-11-09 ENCOUNTER — APPOINTMENT (OUTPATIENT)
Dept: GENERAL RADIOLOGY | Facility: CLINIC | Age: 22
End: 2022-11-09
Attending: UROLOGY

## 2022-11-09 ENCOUNTER — ANESTHESIA EVENT (OUTPATIENT)
Dept: SURGERY | Facility: CLINIC | Age: 22
End: 2022-11-09

## 2022-11-09 VITALS
RESPIRATION RATE: 16 BRPM | DIASTOLIC BLOOD PRESSURE: 73 MMHG | TEMPERATURE: 97.7 F | HEIGHT: 71 IN | WEIGHT: 175.04 LBS | HEART RATE: 88 BPM | BODY MASS INDEX: 24.51 KG/M2 | SYSTOLIC BLOOD PRESSURE: 120 MMHG | OXYGEN SATURATION: 98 %

## 2022-11-09 LAB
ALBUMIN SERPL BCG-MCNC: 4.3 G/DL (ref 3.5–5.2)
ALBUMIN UR-MCNC: 10 MG/DL
ALP SERPL-CCNC: 53 U/L (ref 35–129)
ALT SERPL W P-5'-P-CCNC: 30 U/L (ref 10–50)
ANION GAP SERPL CALCULATED.3IONS-SCNC: 15 MMOL/L (ref 7–15)
ANION GAP SERPL CALCULATED.3IONS-SCNC: 9 MMOL/L (ref 7–15)
APPEARANCE UR: CLEAR
AST SERPL W P-5'-P-CCNC: 27 U/L (ref 10–50)
BASOPHILS # BLD AUTO: 0 10E3/UL (ref 0–0.2)
BASOPHILS NFR BLD AUTO: 0 %
BILIRUB SERPL-MCNC: 0.2 MG/DL
BILIRUB UR QL STRIP: NEGATIVE
BUN SERPL-MCNC: 11.7 MG/DL (ref 6–20)
BUN SERPL-MCNC: 13.6 MG/DL (ref 6–20)
CALCIUM SERPL-MCNC: 9.1 MG/DL (ref 8.6–10)
CALCIUM SERPL-MCNC: 9.1 MG/DL (ref 8.6–10)
CHLORIDE SERPL-SCNC: 103 MMOL/L (ref 98–107)
CHLORIDE SERPL-SCNC: 108 MMOL/L (ref 98–107)
COLOR UR AUTO: ABNORMAL
CREAT SERPL-MCNC: 0.72 MG/DL (ref 0.51–1.17)
CREAT SERPL-MCNC: 1.05 MG/DL (ref 0.51–1.17)
DEPRECATED HCO3 PLAS-SCNC: 18 MMOL/L (ref 22–29)
DEPRECATED HCO3 PLAS-SCNC: 19 MMOL/L (ref 22–29)
EOSINOPHIL # BLD AUTO: 0.1 10E3/UL (ref 0–0.7)
EOSINOPHIL NFR BLD AUTO: 1 %
ERYTHROCYTE [DISTWIDTH] IN BLOOD BY AUTOMATED COUNT: 12.2 % (ref 10–15)
FLUAV RNA SPEC QL NAA+PROBE: NEGATIVE
FLUBV RNA RESP QL NAA+PROBE: NEGATIVE
GFR SERPL CREATININE-BSD FRML MDRD: >90 ML/MIN/1.73M2
GFR SERPL CREATININE-BSD FRML MDRD: >90 ML/MIN/1.73M2
GLUCOSE BLDC GLUCOMTR-MCNC: 99 MG/DL (ref 70–99)
GLUCOSE SERPL-MCNC: 107 MG/DL (ref 70–99)
GLUCOSE SERPL-MCNC: 96 MG/DL (ref 70–99)
GLUCOSE UR STRIP-MCNC: NEGATIVE MG/DL
HCT VFR BLD AUTO: 39 % (ref 35–53)
HGB BLD-MCNC: 13.3 G/DL (ref 11.7–17.7)
HGB UR QL STRIP: ABNORMAL
HOLD SPECIMEN: NORMAL
IMM GRANULOCYTES # BLD: 0.1 10E3/UL
IMM GRANULOCYTES NFR BLD: 1 %
KETONES UR STRIP-MCNC: NEGATIVE MG/DL
LEUKOCYTE ESTERASE UR QL STRIP: NEGATIVE
LYMPHOCYTES # BLD AUTO: 1.6 10E3/UL (ref 0.8–5.3)
LYMPHOCYTES NFR BLD AUTO: 16 %
MCH RBC QN AUTO: 30.9 PG (ref 26.5–33)
MCHC RBC AUTO-ENTMCNC: 34.1 G/DL (ref 31.5–36.5)
MCV RBC AUTO: 91 FL (ref 78–100)
MONOCYTES # BLD AUTO: 0.7 10E3/UL (ref 0–1.3)
MONOCYTES NFR BLD AUTO: 6 %
MUCOUS THREADS #/AREA URNS LPF: PRESENT /LPF
NEUTROPHILS # BLD AUTO: 7.7 10E3/UL (ref 1.6–8.3)
NEUTROPHILS NFR BLD AUTO: 76 %
NITRATE UR QL: NEGATIVE
NRBC # BLD AUTO: 0 10E3/UL
NRBC BLD AUTO-RTO: 0 /100
PH UR STRIP: 5.5 [PH] (ref 5–7)
PLATELET # BLD AUTO: 227 10E3/UL (ref 150–450)
POTASSIUM SERPL-SCNC: 3.8 MMOL/L (ref 3.4–5.3)
POTASSIUM SERPL-SCNC: 3.9 MMOL/L (ref 3.4–5.3)
PROT SERPL-MCNC: 7.1 G/DL (ref 6.4–8.3)
RBC # BLD AUTO: 4.31 10E6/UL (ref 3.8–5.9)
RBC URINE: 20 /HPF
RSV RNA SPEC NAA+PROBE: NEGATIVE
SARS-COV-2 RNA RESP QL NAA+PROBE: NEGATIVE
SODIUM SERPL-SCNC: 136 MMOL/L (ref 136–145)
SODIUM SERPL-SCNC: 136 MMOL/L (ref 136–145)
SP GR UR STRIP: 1.02 (ref 1–1.03)
SQUAMOUS EPITHELIAL: <1 /HPF
UROBILINOGEN UR STRIP-MCNC: NORMAL MG/DL
WBC # BLD AUTO: 10.1 10E3/UL (ref 4–11)
WBC URINE: 3 /HPF

## 2022-11-09 PROCEDURE — G0378 HOSPITAL OBSERVATION PER HR: HCPCS

## 2022-11-09 PROCEDURE — 250N000025 HC SEVOFLURANE, PER MIN: Performed by: UROLOGY

## 2022-11-09 PROCEDURE — 99234 HOSP IP/OBS SM DT SF/LOW 45: CPT | Performed by: PHYSICIAN ASSISTANT

## 2022-11-09 PROCEDURE — 250N000013 HC RX MED GY IP 250 OP 250 PS 637: Performed by: PHYSICIAN ASSISTANT

## 2022-11-09 PROCEDURE — 999N000141 HC STATISTIC PRE-PROCEDURE NURSING ASSESSMENT: Performed by: UROLOGY

## 2022-11-09 PROCEDURE — 52356 CYSTO/URETERO W/LITHOTRIPSY: CPT | Mod: RT | Performed by: UROLOGY

## 2022-11-09 PROCEDURE — 999N000179 XR SURGERY CARM FLUORO LESS THAN 5 MIN W STILLS: Mod: TC

## 2022-11-09 PROCEDURE — 370N000017 HC ANESTHESIA TECHNICAL FEE, PER MIN: Performed by: UROLOGY

## 2022-11-09 PROCEDURE — 272N000001 HC OR GENERAL SUPPLY STERILE: Performed by: UROLOGY

## 2022-11-09 PROCEDURE — 82962 GLUCOSE BLOOD TEST: CPT

## 2022-11-09 PROCEDURE — 250N000009 HC RX 250: Performed by: ANESTHESIOLOGY

## 2022-11-09 PROCEDURE — 74420 UROGRAPHY RTRGR +-KUB: CPT | Mod: 26 | Performed by: UROLOGY

## 2022-11-09 PROCEDURE — 999N000128 HC STATISTIC PERIPHERAL IV START W/O US GUIDANCE

## 2022-11-09 PROCEDURE — 360N000084 HC SURGERY LEVEL 4 W/ FLUORO, PER MIN: Performed by: UROLOGY

## 2022-11-09 PROCEDURE — 87637 SARSCOV2&INF A&B&RSV AMP PRB: CPT | Performed by: EMERGENCY MEDICINE

## 2022-11-09 PROCEDURE — 258N000003 HC RX IP 258 OP 636: Performed by: ANESTHESIOLOGY

## 2022-11-09 PROCEDURE — 99203 OFFICE O/P NEW LOW 30 MIN: CPT | Mod: 25 | Performed by: UROLOGY

## 2022-11-09 PROCEDURE — 255N000002 HC RX 255 OP 636: Performed by: UROLOGY

## 2022-11-09 PROCEDURE — 710N000010 HC RECOVERY PHASE 1, LEVEL 2, PER MIN: Performed by: UROLOGY

## 2022-11-09 PROCEDURE — 250N000011 HC RX IP 250 OP 636: Performed by: STUDENT IN AN ORGANIZED HEALTH CARE EDUCATION/TRAINING PROGRAM

## 2022-11-09 PROCEDURE — 36415 COLL VENOUS BLD VENIPUNCTURE: CPT

## 2022-11-09 PROCEDURE — C1769 GUIDE WIRE: HCPCS | Performed by: UROLOGY

## 2022-11-09 PROCEDURE — 258N000003 HC RX IP 258 OP 636: Performed by: EMERGENCY MEDICINE

## 2022-11-09 PROCEDURE — 80048 BASIC METABOLIC PNL TOTAL CA: CPT

## 2022-11-09 PROCEDURE — C2617 STENT, NON-COR, TEM W/O DEL: HCPCS | Performed by: UROLOGY

## 2022-11-09 PROCEDURE — C9803 HOPD COVID-19 SPEC COLLECT: HCPCS | Performed by: EMERGENCY MEDICINE

## 2022-11-09 PROCEDURE — 250N000013 HC RX MED GY IP 250 OP 250 PS 637

## 2022-11-09 PROCEDURE — 250N000011 HC RX IP 250 OP 636: Performed by: ANESTHESIOLOGY

## 2022-11-09 DEVICE — URETERAL STENT
Type: IMPLANTABLE DEVICE | Site: URETER | Status: FUNCTIONAL
Brand: PERCUFLEX™ PLUS

## 2022-11-09 RX ORDER — ATROPA BELLADONNA AND OPIUM 16.2; 6 MG/1; MG/1
60 SUPPOSITORY RECTAL EVERY 8 HOURS PRN
Status: DISCONTINUED | OUTPATIENT
Start: 2022-11-09 | End: 2022-11-09

## 2022-11-09 RX ORDER — ONDANSETRON 2 MG/ML
INJECTION INTRAMUSCULAR; INTRAVENOUS PRN
Status: DISCONTINUED | OUTPATIENT
Start: 2022-11-09 | End: 2022-11-09

## 2022-11-09 RX ORDER — HYDROMORPHONE HYDROCHLORIDE 1 MG/ML
0.2 INJECTION, SOLUTION INTRAMUSCULAR; INTRAVENOUS; SUBCUTANEOUS EVERY 5 MIN PRN
Status: DISCONTINUED | OUTPATIENT
Start: 2022-11-09 | End: 2022-11-09 | Stop reason: HOSPADM

## 2022-11-09 RX ORDER — LIDOCAINE 40 MG/G
CREAM TOPICAL
Status: DISCONTINUED | OUTPATIENT
Start: 2022-11-09 | End: 2022-11-09 | Stop reason: HOSPADM

## 2022-11-09 RX ORDER — DEXAMETHASONE SODIUM PHOSPHATE 4 MG/ML
INJECTION, SOLUTION INTRA-ARTICULAR; INTRALESIONAL; INTRAMUSCULAR; INTRAVENOUS; SOFT TISSUE PRN
Status: DISCONTINUED | OUTPATIENT
Start: 2022-11-09 | End: 2022-11-09

## 2022-11-09 RX ORDER — TAMSULOSIN HYDROCHLORIDE 0.4 MG/1
0.4 CAPSULE ORAL DAILY
Status: DISCONTINUED | OUTPATIENT
Start: 2022-11-09 | End: 2022-11-09 | Stop reason: HOSPADM

## 2022-11-09 RX ORDER — IBUPROFEN 600 MG/1
600 TABLET, FILM COATED ORAL EVERY 6 HOURS PRN
Status: DISCONTINUED | OUTPATIENT
Start: 2022-11-09 | End: 2022-11-09 | Stop reason: HOSPADM

## 2022-11-09 RX ORDER — ONDANSETRON 2 MG/ML
4 INJECTION INTRAMUSCULAR; INTRAVENOUS EVERY 30 MIN PRN
Status: DISCONTINUED | OUTPATIENT
Start: 2022-11-09 | End: 2022-11-09 | Stop reason: HOSPADM

## 2022-11-09 RX ORDER — ONDANSETRON 4 MG/1
4 TABLET, ORALLY DISINTEGRATING ORAL EVERY 30 MIN PRN
Status: DISCONTINUED | OUTPATIENT
Start: 2022-11-09 | End: 2022-11-09 | Stop reason: HOSPADM

## 2022-11-09 RX ORDER — KETOROLAC TROMETHAMINE 15 MG/ML
15 INJECTION, SOLUTION INTRAMUSCULAR; INTRAVENOUS EVERY 6 HOURS PRN
Status: DISCONTINUED | OUTPATIENT
Start: 2022-11-09 | End: 2022-11-09 | Stop reason: HOSPADM

## 2022-11-09 RX ORDER — FENTANYL CITRATE 50 UG/ML
INJECTION, SOLUTION INTRAMUSCULAR; INTRAVENOUS PRN
Status: DISCONTINUED | OUTPATIENT
Start: 2022-11-09 | End: 2022-11-09

## 2022-11-09 RX ORDER — ACETAMINOPHEN 325 MG/1
975 TABLET ORAL ONCE
Status: COMPLETED | OUTPATIENT
Start: 2022-11-09 | End: 2022-11-09

## 2022-11-09 RX ORDER — AMOXICILLIN 250 MG
1 CAPSULE ORAL DAILY
Status: DISCONTINUED | OUTPATIENT
Start: 2022-11-09 | End: 2022-11-09

## 2022-11-09 RX ORDER — SODIUM CHLORIDE, SODIUM LACTATE, POTASSIUM CHLORIDE, CALCIUM CHLORIDE 600; 310; 30; 20 MG/100ML; MG/100ML; MG/100ML; MG/100ML
INJECTION, SOLUTION INTRAVENOUS CONTINUOUS PRN
Status: DISCONTINUED | OUTPATIENT
Start: 2022-11-09 | End: 2022-11-09

## 2022-11-09 RX ORDER — ONDANSETRON 4 MG/1
4 TABLET, ORALLY DISINTEGRATING ORAL EVERY 6 HOURS PRN
Status: DISCONTINUED | OUTPATIENT
Start: 2022-11-09 | End: 2022-11-09 | Stop reason: HOSPADM

## 2022-11-09 RX ORDER — ACETAMINOPHEN 500 MG
1000 TABLET ORAL EVERY 6 HOURS PRN
Status: DISCONTINUED | OUTPATIENT
Start: 2022-11-09 | End: 2022-11-09 | Stop reason: HOSPADM

## 2022-11-09 RX ORDER — ATROPA BELLADONNA AND OPIUM 16.2; 3 MG/1; MG/1
30 SUPPOSITORY RECTAL EVERY 6 HOURS PRN
Status: DISCONTINUED | OUTPATIENT
Start: 2022-11-09 | End: 2022-11-09 | Stop reason: HOSPADM

## 2022-11-09 RX ORDER — OXYBUTYNIN CHLORIDE 5 MG/1
5 TABLET ORAL 3 TIMES DAILY PRN
Qty: 15 TABLET | Refills: 0 | Status: ON HOLD | OUTPATIENT
Start: 2022-11-09 | End: 2022-11-16

## 2022-11-09 RX ORDER — IOPAMIDOL 510 MG/ML
INJECTION, SOLUTION INTRAVASCULAR PRN
Status: DISCONTINUED | OUTPATIENT
Start: 2022-11-09 | End: 2022-11-09

## 2022-11-09 RX ORDER — CEFAZOLIN SODIUM/WATER 2 G/20 ML
2 SYRINGE (ML) INTRAVENOUS SEE ADMIN INSTRUCTIONS
Status: DISCONTINUED | OUTPATIENT
Start: 2022-11-09 | End: 2022-11-09 | Stop reason: HOSPADM

## 2022-11-09 RX ORDER — PHENAZOPYRIDINE HYDROCHLORIDE 100 MG/1
100 TABLET, FILM COATED ORAL 3 TIMES DAILY
Qty: 9 TABLET | Refills: 0 | Status: SHIPPED | OUTPATIENT
Start: 2022-11-09 | End: 2022-11-12

## 2022-11-09 RX ORDER — ACETAMINOPHEN 650 MG/1
650 SUPPOSITORY RECTAL EVERY 6 HOURS PRN
Status: DISCONTINUED | OUTPATIENT
Start: 2022-11-09 | End: 2022-11-09 | Stop reason: HOSPADM

## 2022-11-09 RX ORDER — DIMENHYDRINATE 50 MG
50 TABLET ORAL
Qty: 7 TABLET | Refills: 0 | Status: SHIPPED | OUTPATIENT
Start: 2022-11-09

## 2022-11-09 RX ORDER — LIDOCAINE HYDROCHLORIDE 20 MG/ML
INJECTION, SOLUTION INFILTRATION; PERINEURAL PRN
Status: DISCONTINUED | OUTPATIENT
Start: 2022-11-09 | End: 2022-11-09

## 2022-11-09 RX ORDER — FENTANYL CITRATE 50 UG/ML
25 INJECTION, SOLUTION INTRAMUSCULAR; INTRAVENOUS
Status: DISCONTINUED | OUTPATIENT
Start: 2022-11-09 | End: 2022-11-09 | Stop reason: HOSPADM

## 2022-11-09 RX ORDER — SENNA AND DOCUSATE SODIUM 50; 8.6 MG/1; MG/1
1 TABLET, FILM COATED ORAL 2 TIMES DAILY
Qty: 30 TABLET | Refills: 0 | Status: SHIPPED | OUTPATIENT
Start: 2022-11-09

## 2022-11-09 RX ORDER — OXYCODONE HYDROCHLORIDE 5 MG/1
5 TABLET ORAL EVERY 4 HOURS PRN
Status: DISCONTINUED | OUTPATIENT
Start: 2022-11-09 | End: 2022-11-09 | Stop reason: HOSPADM

## 2022-11-09 RX ORDER — SODIUM CHLORIDE, SODIUM LACTATE, POTASSIUM CHLORIDE, CALCIUM CHLORIDE 600; 310; 30; 20 MG/100ML; MG/100ML; MG/100ML; MG/100ML
INJECTION, SOLUTION INTRAVENOUS CONTINUOUS
Status: DISCONTINUED | OUTPATIENT
Start: 2022-11-09 | End: 2022-11-09 | Stop reason: HOSPADM

## 2022-11-09 RX ORDER — PROPOFOL 10 MG/ML
INJECTION, EMULSION INTRAVENOUS PRN
Status: DISCONTINUED | OUTPATIENT
Start: 2022-11-09 | End: 2022-11-09

## 2022-11-09 RX ORDER — CEFAZOLIN SODIUM/WATER 2 G/20 ML
2 SYRINGE (ML) INTRAVENOUS
Status: DISCONTINUED | OUTPATIENT
Start: 2022-11-09 | End: 2022-11-09 | Stop reason: HOSPADM

## 2022-11-09 RX ORDER — FENTANYL CITRATE 50 UG/ML
25 INJECTION, SOLUTION INTRAMUSCULAR; INTRAVENOUS EVERY 5 MIN PRN
Status: DISCONTINUED | OUTPATIENT
Start: 2022-11-09 | End: 2022-11-09 | Stop reason: HOSPADM

## 2022-11-09 RX ORDER — ONDANSETRON 2 MG/ML
4 INJECTION INTRAMUSCULAR; INTRAVENOUS EVERY 6 HOURS PRN
Status: DISCONTINUED | OUTPATIENT
Start: 2022-11-09 | End: 2022-11-09 | Stop reason: HOSPADM

## 2022-11-09 RX ORDER — MEPERIDINE HYDROCHLORIDE 25 MG/ML
12.5 INJECTION INTRAMUSCULAR; INTRAVENOUS; SUBCUTANEOUS
Status: DISCONTINUED | OUTPATIENT
Start: 2022-11-09 | End: 2022-11-09 | Stop reason: HOSPADM

## 2022-11-09 RX ADMIN — SUCCINYLCHOLINE CHLORIDE 200 MG: 20 INJECTION, SOLUTION INTRAMUSCULAR; INTRAVENOUS; PARENTERAL at 11:04

## 2022-11-09 RX ADMIN — Medication 20 MG: at 11:41

## 2022-11-09 RX ADMIN — DEXAMETHASONE SODIUM PHOSPHATE 10 MG: 4 INJECTION, SOLUTION INTRA-ARTICULAR; INTRALESIONAL; INTRAMUSCULAR; INTRAVENOUS; SOFT TISSUE at 11:24

## 2022-11-09 RX ADMIN — MIDAZOLAM 2 MG: 1 INJECTION INTRAMUSCULAR; INTRAVENOUS at 10:50

## 2022-11-09 RX ADMIN — Medication 2 G: at 11:07

## 2022-11-09 RX ADMIN — IBUPROFEN 600 MG: 600 TABLET, FILM COATED ORAL at 16:52

## 2022-11-09 RX ADMIN — SUGAMMADEX 200 MG: 100 INJECTION, SOLUTION INTRAVENOUS at 12:11

## 2022-11-09 RX ADMIN — SODIUM CHLORIDE, POTASSIUM CHLORIDE, SODIUM LACTATE AND CALCIUM CHLORIDE: 600; 310; 30; 20 INJECTION, SOLUTION INTRAVENOUS at 10:59

## 2022-11-09 RX ADMIN — Medication 20 MG: at 11:25

## 2022-11-09 RX ADMIN — SODIUM CHLORIDE: 900 INJECTION, SOLUTION INTRAVENOUS at 01:22

## 2022-11-09 RX ADMIN — FENTANYL CITRATE 50 MCG: 50 INJECTION, SOLUTION INTRAMUSCULAR; INTRAVENOUS at 11:41

## 2022-11-09 RX ADMIN — FENTANYL CITRATE 50 MCG: 50 INJECTION, SOLUTION INTRAMUSCULAR; INTRAVENOUS at 11:27

## 2022-11-09 RX ADMIN — Medication 30 MG: at 11:12

## 2022-11-09 RX ADMIN — PROPOFOL 200 MG: 10 INJECTION, EMULSION INTRAVENOUS at 11:04

## 2022-11-09 RX ADMIN — ACETAMINOPHEN 975 MG: 325 TABLET, FILM COATED ORAL at 13:15

## 2022-11-09 RX ADMIN — ONDANSETRON 4 MG: 2 INJECTION INTRAMUSCULAR; INTRAVENOUS at 12:08

## 2022-11-09 RX ADMIN — SODIUM CHLORIDE, POTASSIUM CHLORIDE, SODIUM LACTATE AND CALCIUM CHLORIDE: 600; 310; 30; 20 INJECTION, SOLUTION INTRAVENOUS at 11:44

## 2022-11-09 RX ADMIN — LIDOCAINE HYDROCHLORIDE 100 MG: 20 INJECTION, SOLUTION INFILTRATION; PERINEURAL at 11:04

## 2022-11-09 ASSESSMENT — ACTIVITIES OF DAILY LIVING (ADL)
ADLS_ACUITY_SCORE: 31
ADLS_ACUITY_SCORE: 35
ADLS_ACUITY_SCORE: 35
ADLS_ACUITY_SCORE: 31
ADLS_ACUITY_SCORE: 31
ADLS_ACUITY_SCORE: 35
ADLS_ACUITY_SCORE: 31

## 2022-11-09 NOTE — OR NURSING
Dr. Weathers notified of patient's pain/cramping on right side.  See MAR for intervention.     Dr. Reveles notified of patient's report of continual R calf discomfort when moving.

## 2022-11-09 NOTE — ED TRIAGE NOTES
Patient is back for kidney stone pain. Pain is in right flank and back area. She does not think she can pass this stone on her own. She is also nauseous   VSS

## 2022-11-09 NOTE — ANESTHESIA POSTPROCEDURE EVALUATION
Patient: Regan Gibson    Procedure: Procedure(s):  CYSTOURETEROSCOPY  LITHOTRIPSY, CALCULUS, URETER, USING HOLMIUM LASER, WITH URETERAL STENT INSERTION       Anesthesia Type:  General    Note:  Disposition: Outpatient   Postop Pain Control: Uneventful            Sign Out: Well controlled pain   PONV: No   Neuro/Psych: Uneventful            Sign Out: Acceptable/Baseline neuro status   Airway/Respiratory: Uneventful            Sign Out: Acceptable/Baseline resp. status   CV/Hemodynamics: Uneventful            Sign Out: Acceptable CV status; No obvious hypovolemia; No obvious fluid overload   Other NRE: NONE   DID A NON-ROUTINE EVENT OCCUR? No           Last vitals:  Vitals Value Taken Time   /65 11/09/22 1315   Temp 36.7  C (98.1  F) 11/09/22 1315   Pulse 87 11/09/22 1316   Resp 22 11/09/22 1316   SpO2 99 % 11/09/22 1316   Vitals shown include unvalidated device data.    Electronically Signed By: Kody Weathers MD  November 9, 2022  1:17 PM

## 2022-11-09 NOTE — PROGRESS NOTES
Pt understood discharge orders/instructions : Yes.  Pt's belongings : Pt took.  Discharge medications : Pt will pick it up at her own pharmacy, written papers given.  Lines : PIV was removed.  How is pt getting home/transportion : Pt's friend.  Discharge papers : Given to the pt.  Follow up appt : As stated on the papers.  Home supply : N/A.

## 2022-11-09 NOTE — PROGRESS NOTES
[facility]    Medicine Progress Note - ED Observation     Date of Admission:  11/8/2022    Assessment & Plan     Regan Gibson is a 22 year old adult admitted on 11/8/2022. She has a recent diagnosis of kidney stones who presents to the [facility] ED with right flank pain.       ##. Obstructing Stones (3mm & 4mm) in Proximal Right Ureter w/ Mild-to-Moderate Hydronephrosis: Right flank pain with radiation down the groin. Pain up to 10/10 however improved to 1/10 right now after receiving Toradol. Pain has not been controlled with Tylenol and ibuprofen at home. Did not tolerate oxycodone at recent admssion. Reports nausea, urinary frequency, urgency, and decreased urine stream, occasional mild hematuria, nausea.  Otherwise denies fever, chills, vomiting. Has been straining urine with some very small stone fragments. Was admitted ED observation unit on 11/3/2022 with similar symptoms and CT AP w/ 4 mm obstructing stone in proximal right ureter just below UPJ resulting in minimal right hydronephrosis; single 3 mm nonobstructing stone in the right kidney. Managed with IVF, Flomax and pain control with Toradol w/ improvement. Urology consulted and recommended discharge with trial of MET. In ED today, , /86, RR 19, SaO2 95% on RA, Temp 98.9  F. Normal CMP with bicarb 18 otherwise unremarkable.  Normal CBC with WBC 10.1. UA with 10 protein, moderate blood, 3 WBC, 20 RBC. Covid 19 PCR, Flu A/B, RSV negative. CT abdomen pelvis reports two tandem stones in proximal right ureter measuring 4 mm and 3 mm which cause mild-to-moderate hydronephrosis; nonobstructive nephrolithiasis on left. In ED patient was given 1 L NS bolus,  mL/h, Toradol 15 mg IV x1, Zofran 4 mg IV x1.  Per ED, patient d/w urology, who recommends admit to ED observation unit for pain control and they will see the patient in the AM. No events overnight. Patient reports right sided flank pain  "has improved. Per urology note, plan for stent placement and discharge home today  -Toradol 15 mg q 6 hours, Flomax daily, Scheduled Tylenol.   -Senna S1 tab daily  -Zofran prn  -N.p.o.  - mL/h  -Strict I & O  -Strain urine  -Urology consult with recommendations:   - NPO, IVF   - to OR today for cystoureteroscopy, possible URS/HLL, R-sided stent placement   - plan for discharge to home after  - if able to perform primary URS, will consider leaving stent on string for removal at home in 1 week; if unable to perform primary URS, will place stent and schedule for definitive stone surgery in 2-3 weeks (note: patient will be out of town over Thanksgiving holiday, for scheduling considerations)            Diet: NPO for Medical/Clinical Reasons Except for: Ice Chips    DVT Prophylaxis: Low Risk/Ambulatory with no VTE prophylaxis indicated and Ambulate every shift  Alvarado Catheter: Not present  Central Lines: None  Cardiac Monitoring: None  Code Status: Full Code      Disposition Plan    Likely discharge today after sten placement per urology    The patient's care was discussed with the Attending Physician, Dr. Chang, Bedside Nurse, Patient and Urology Team Team.    IBRAHIMA Maguire CNP  ED Observation  [facility]  Securely message with the Vocera Web Console (learn more here)  Text page via [employer] Paging/Directory         Clinically Significant Risk Factors Present on Admission                              ______________________________________________________________________    Interval History   No events overnight. Patient reports right sided flank pain has improved. Per urology note from this morning:   \"Plan:    - NPO, IVF   - to OR today for cystoureteroscopy, possible URS/HLL, R-sided stent placement   - plan for discharge to home after  - if able to perform primary URS, will consider leaving stent on string for removal at home in 1 week; if unable to " "perform primary URS, will place stent and schedule for definitive stone surgery in 2-3 weeks (note: patient will be out of town over Thanksgiving holiday, for scheduling considerations)     \"    Data reviewed today: I reviewed all medications, new labs and imaging results over the last 24 hours.     Physical Exam   Vital Signs: Temp: 97.8  F (36.6  C) Temp src: Oral BP: 124/54 Pulse: 82   Resp: 18 SpO2: 100 % O2 Device: None (Room air)    Weight: 175 lbs .72 oz       Constitutional: awake, alert, cooperative, no apparent distress, and appears stated age  Eyes: Lids and lashes normal, pupils equal, round and reactive to light, extra ocular muscles intact, sclera clear, conjunctiva normal  ENT: Normocephalic, without obvious abnormality, atraumatic, sinuses nontender on palpation, external ears without lesions, oral pharynx with moist mucous membranes, tonsils without erythema or exudates, gums normal and good dentition.  Hematologic / Lymphatic: no cervical lymphadenopathy  Respiratory: No increased work of breathing, good air exchange, clear to auscultation bilaterally, no crackles or wheezing  Cardiovascular: Normal apical impulse, regular rate and rhythm, normal S1 and S2, no S3 or S4, and no murmur noted  GI: No scars, normal bowel sounds, soft, non-distended, right sided tenderness, no masses palpated, no hepatosplenomegally. Right CVA tenderness.   Skin: no bruising or bleeding  Musculoskeletal: There is no redness, warmth, or swelling of the joints.  Full range of motion noted.  Motor strength is 5 out of 5 all extremities bilaterally.  Tone is normal.  Neurologic: Awake, alert, oriented to name, place and time.  Cranial nerves II-XII are grossly intact.  Motor is 5 out of 5 bilaterally.  Cerebellar finger to nose, heel to shin intact.  Sensory is intact.  Babinski down going, Romberg negative, and gait is normal.  Neuropsychiatric: General: normal, calm and normal eye contact    Data   Recent Labs   Lab " 11/08/22  2353 11/03/22  0755 11/03/22  0119 11/02/22  2141   WBC 10.1 11.0 13.3*  --    HGB 13.3 12.9 13.8  --    MCV 91 93 92  --     247 257  --     139  --  138   POTASSIUM 3.9 3.9  --  4.4   CHLORIDE 103 107  --  105   CO2 18* 18*  --  20*   BUN 13.6 12.7  --  9.7   CR 1.05 0.76  --  0.65   ANIONGAP 15 14  --  13   JOLIE 9.1 9.2  --  9.4   * 141*  --  90   ALBUMIN 4.3  --   --  4.4   PROTTOTAL 7.1  --   --  7.2   BILITOTAL 0.2  --   --  0.3   ALKPHOS 53  --   --  54   ALT 30  --   --  22   AST 27  --   --  48   LIPASE  --   --   --  69*     Recent Results (from the past 24 hour(s))   CT Abdomen Pelvis w/o Contrast    Narrative    EXAM: CT ABDOMEN PELVIS W/O CONTRAST  LOCATION: [facility]  DATE/TIME: 11/9/2022 12:27 AM    INDICATION: flank pain, known stone  COMPARISON: 11/02/2022.  TECHNIQUE: CT scan of the abdomen and pelvis was performed without IV contrast. Multiplanar reformats were obtained. Dose reduction techniques were used.  CONTRAST: None.    FINDINGS:   LOWER CHEST: Normal.    HEPATOBILIARY: Normal.    PANCREAS: Normal.    SPLEEN: Normal.    ADRENAL GLANDS: Normal.    KIDNEYS/BLADDER: There are now two tandem stones in the proximal right ureter measuring 4 mm and 3 mm which cause mild-to-moderate hydronephrosis. No stones remain within the right renal collecting system. On the left, four nonobstructing intrarenal   stones measuring up to 3 mm in size. Bladder is decompressed.    BOWEL: Normal. No obstruction or inflammation. Normal appendix.    LYMPH NODES: Normal.    VASCULATURE: Unremarkable.    PELVIC ORGANS: Normal.    MUSCULOSKELETAL: Normal.      Impression    IMPRESSION:   1.  There are now two tandem stones in the proximal right ureter measuring 4 mm and 3 mm which cause mild-to-moderate hydronephrosis.  2.  Nonobstructive nephrolithiasis on the left.       Medications     sodium chloride 150 mL/hr at 11/09/22 0641       sodium  chloride (PF)  3 mL Intracatheter Q8H

## 2022-11-09 NOTE — CONSULTS
Urology Consult History and Physical    Name: Regan Gibson    MRN: [id number]   YOB: 2000       We were asked to see Dana Gibson at the request of Dr. Chang for evaluation and treatment of ureterolithiasis.        Chief Complaint:   Ureterolithiasis    History is obtained from the patient          History of Present Illness:   Regan Gibson is a 22 year old trans female on HRT who is being seen for evaluation of ureterolithiasis. Recently seen at [facility] on 11/2, diagnosed with 4mm R ureteral stone, discharged on MET. Presented to ED overnight with severe recurrent right-sided flank radiating to groin and nausea/vomiting. Reports that she has been straining her urine and passing some small stones since her ED visit, but has not seen anything that looks to be half a centimeter. Denies hematuria, dysuria, fevers at home.     VSS, afebrile. Mildly tachycardic to 102 on arrival. WBC, Cr WNL. CT with 4mm and 3mm stones in mid-proximal R ureter, other previously seen small stones in R kidney no longer present, with mild/mod hydro upstream. Also with tiny nonobstructing stones in L kidney. On IVF overnight, pain improved with PRN's but still present.            Past Medical History:   No past medical history on file.         Past Surgical History:   No past surgical history on file.         Social History:     Social History     Tobacco Use     Smoking status: Not on file     Smokeless tobacco: Not on file   Substance Use Topics     Alcohol use: Not on file            Family History:   No family history on file.           Allergies:     Allergies   Allergen Reactions     Ibuprofen Nausea            Medications:     Current Facility-Administered Medications   Medication     acetaminophen (TYLENOL) tablet 1,000 mg    Or     acetaminophen (TYLENOL) Suppository 650 mg     ibuprofen (ADVIL/MOTRIN) tablet 600 mg     ketorolac (TORADOL) injection 15 mg     melatonin tablet 1 mg     ondansetron (ZOFRAN ODT) ODT tab  "4 mg    Or     ondansetron (ZOFRAN) injection 4 mg     ondansetron (ZOFRAN) injection 4 mg     sodium chloride 0.9% infusion     Current Outpatient Medications   Medication Sig     acetaminophen (TYLENOL) 325 MG tablet Take 3 tablets (975 mg) by mouth every 6 hours     ibuprofen (ADVIL/MOTRIN) 200 MG tablet Take 3 tablets (600 mg) by mouth every 6 hours as needed for pain     ondansetron (ZOFRAN ODT) 4 MG ODT tab Take 1 tablet (4 mg) by mouth every 6 hours as needed for nausea     tamsulosin (FLOMAX) 0.4 MG capsule Take 1 capsule (0.4 mg) by mouth daily for 30 days             Review of Systems:    ROS: 10 point ROS neg other than the symptoms noted above in the HPI.          Physical Exam:     Patient Vitals for the past 24 hrs:   BP Temp Temp src Pulse Resp SpO2 Height Weight   11/09/22 0447 (!) 145/82 -- -- 98 18 96 % -- --   11/08/22 2333 (!) 146/86 98.9  F (37.2  C) Oral 102 19 95 % 1.803 m (5' 10.98\") 79.4 kg (175 lb 0.7 oz)     General: age-appropriate appearing adult in NAD  HEENT: Head AT/NC, EOMI, CN Grossly intact  Lungs: no respiratory distress, or pursed lip breathing  Back: mild R CVAT   Abdomen: soft, non-distended, non-tender  : small male external genitalia  Musculoskeletal: extremities normal, no peripheral edema  Neuro: Alert, oriented, speech and mentation normal  Psych: affect and mood normal          Data:   All laboratory data reviewed:    Recent Labs   Lab 11/08/22 2353 11/03/22  0755 11/03/22  0119   WBC 10.1 11.0 13.3*   HGB 13.3 12.9 13.8    247 257     Recent Labs   Lab 11/08/22  2353 11/03/22  0755 11/02/22  2141    139 138   POTASSIUM 3.9 3.9 4.4   CHLORIDE 103 107 105   CO2 18* 18* 20*   BUN 13.6 12.7 9.7   CR 1.05 0.76 0.65   * 141* 90   JOLIE 9.1 9.2 9.4     Recent Labs   Lab 11/08/22  2344   COLOR Light Yellow   APPEARANCE Clear   URINEGLC Negative   URINEBILI Negative   URINEKETONE Negative   SG 1.021   URINEPH 5.5   PROTEIN 10*   NITRITE Negative   LEUKEST " Negative   RBCU 20*   WBCU 3       All pertinent imaging reviewed:  EXAM: CT ABDOMEN PELVIS W/O CONTRAST   FINDINGS:   LOWER CHEST: Normal.     HEPATOBILIARY: Normal.     PANCREAS: Normal.     SPLEEN: Normal.     ADRENAL GLANDS: Normal.     KIDNEYS/BLADDER: There are now two tandem stones in the proximal right ureter measuring 4 mm and 3 mm which cause mild-to-moderate hydronephrosis. No stones remain within the right renal collecting system. On the left, four nonobstructing intrarenal stones measuring up to 3 mm in size. Bladder is decompressed.     BOWEL: Normal. No obstruction or inflammation. Normal appendix.     LYMPH NODES: Normal.     VASCULATURE: Unremarkable.     PELVIC ORGANS: Normal.     MUSCULOSKELETAL: Normal.                                                                  IMPRESSION:   1.  There are now two tandem stones in the proximal right ureter measuring 4 mm and 3 mm which cause mild-to-moderate hydronephrosis.  2.  Nonobstructive nephrolithiasis on the left.         Impression and Plan:   Impression:   22yoMTF on HRT, otherwise healthy, with obstructing 3-4mm tandem stones in R mid-prox ureter, no evidence of infection, but with poorly controlled pain and nausea/vomiting. Counseled on another trial of MET vs. procedural intervention - given bounce back to ED within 1 week with minimal movement of stones, patient would like to proceed with operative intervention. Discussed that primary ureteroscopy/laser lithotripsy will be attempted, pending anatomic limitations and availability of laser, vs. stent placement with delayed definitive stone surgery. Counseled on risks, benefits, and alternatives. Patient is agreeable to proceed. Consent signed, orders placed, case request placed.       Plan:   - NPO, IVF   - to OR today for cystoureteroscopy, possible URS/HLL, R-sided stent placement   - plan for discharge to home after  - if able to perform primary URS, will consider leaving stent on string for  removal at home in 1 week; if unable to perform primary URS, will place stent and schedule for definitive stone surgery in 2-3 weeks (note: patient will be out of town over Thanksgiving holiday, for scheduling considerations)       Patient discussed with chief resident and staff on call, Dr. Nancy Sarkar MD  Urology Resident, PGY-2

## 2022-11-09 NOTE — ANESTHESIA PROCEDURE NOTES
Airway       Patient location during procedure: OR       Procedure Start/Stop Times: 11/9/2022 11:06 AM  Staff -        CRNA: Myriam Fuentes APRN CRNA       Performed By: CRNA  Consent for Airway        Urgency: elective  Indications and Patient Condition       Indications for airway management: benitez-procedural       Induction type:intravenous       Mask difficulty assessment: 0 - not attempted    Final Airway Details       Final airway type: endotracheal airway       Successful airway: ETT - single  Endotracheal Airway Details        ETT size (mm): 7.5       Cuffed: yes       Successful intubation technique: direct laryngoscopy and video laryngoscopy       VL Blade Size: Glidescope 4       Grade View of Cords: 1       Adjucts: stylet       Position: Right       Measured from: lips       Secured at (cm): 26       Bite block used: None    Post intubation assessment        Placement verified by: capnometry, equal breath sounds and chest rise        Number of attempts at approach: 1       Secured with: pink tape       Ease of procedure: easy       Dentition: Intact and Unchanged    Medication(s) Administered   Medication Administration Time: 11/9/2022 11:06 AM    Additional Comments       Glidescope elected d/t smaller mouth opening

## 2022-11-09 NOTE — BRIEF OP NOTE
[facility]    Brief Operative Note    Pre-operative diagnosis: Ureteral stone [N20.1]  Post-operative diagnosis Same as pre-operative diagnosis    Procedure: Procedure(s):  CYSTOURETEROSCOPY  LITHOTRIPSY, CALCULUS, URETER, USING HOLMIUM LASER, WITH URETERAL STENT INSERTION  Surgeon: Surgeon(s) and Role:     * Andrew Cruz MD - Primary     * Debbi Meredith MD - Resident - Assisting  Anesthesia: General   Estimated Blood Loss: Minimal    Drains: 6Fr x 26 cm double J RIGHT ureteral stent  Specimens: * No specimens in log *  Findings:   None.  Complications: None .  Implants:   Implant Name Type Inv. Item Serial No.  Lot No. LRB No. Used Action   STENT URETERAL PERCUFLEX PLUS [id number] - [id number] Stent STENT URETERAL PERCUFLEX PLUS [id number]  [employer] [place] [id number] Right 1 Implanted       Plan  Stent removal in clinic in 1 week  Flomax, pyridium, oxybutynin + stool softener as antispasmodics  Tylenol, ibuprofen for pain control  Can add dramamine at night for adjunctive pain control  Discharge instructions added  Follow up message sent

## 2022-11-09 NOTE — OP NOTE
OPERATIVE REPORT   11/9/22    PREOPERATIVE DIAGNOSIS: Right ureteral stone(s)    POSTOPERATIVE DIAGNOSIS:  Same    PROCEDURES PERFORMED:   1. Cystourethroscopy with right ureteroscopy, laser lithotripsy, and stone extraction.   2. Right retrograde pyelogram with intraoperative interpretation of fluoroscopic imaging.  3. Placement of right ureteral stent.    STAFF SURGEON: Andrew Cruz MD    RESIDENT: Debbi Meredith MD    ANESTHESIA: General    ESTIMATED BLOOD LOSS: 2 mL.     DRAINS:  6 Fr x 26 cm double J right ureteral stemt    OPERATIVE INDICATIONS:   Regan Gibson is a 22 year old adult who presented with a right obstructing ureteral stone. The patient was counseled on the alternatives, risks, and benefits and elected to proceed with the above stated procedure.    DESCRIPTION OF PROCEDURE:    After informed consent was obtained, the patient was taken to the operating room, and moved to the operating table.  After adequate anesthesia was induced, the patient was repositioned in dorsal lithotomy position and prepped and draped in the usual sterile fashion. A timeout was taken to confirm correct patient, procedure and laterality.     A 22-Tamazight cystoscope was inserted into a well lubricated urethra. The urethra was unremarkable.  The bladder was free of tumors, stones or diverticuli.  The media was clear.  Bilateral ureteral orifices were orthotopic.  A Sensor guidewire was advanced into the right renal pelvis with the aid of a 5-Tamazight open ended ureteral catheter.  A retrograde pyelogram demonstrated mild hydronephrosis but no filling defects.     The wire was then replaced and a semirigid ureteroscope was navigated into the right ureter over a superstiff wire. In the midureter there was a ~4 mm flat stone. This was broken into two pieces which were removed with halo basket and placed into the bladder. Scope re-inserted to confirm no visible residual fragments. Pullback ureteroscopy revealed no  ureteral injuries.    A 6Fr x 26 cm stent was advanced over the guidewire under fluoroscopic guidance with a good curl in the renal pelvis and bladder.  The stent passed up easily with no appreciable resistance.  The bladder was then drained. Stone fragments drained from bladder but were not ultimately recovered for stone analysis.    The patient tolerated the procedure well.  There were no complications. The small stone was removed but ultimately not collected for specimen.     PLAN:   - Return to floor, medicine primary. Recommend: daily flomax, PRN TID oxybutynin (always with stool softener), TID pyridium x 3 days, and dramamine at night for adjunctive pain control. Tylenol and ibuprofen are most effective    Physician Attestation   I was present for the entire procedure between opening and closing, including all key portions of the operation.    Andrew Cruz MD  Urology  [facility]

## 2022-11-09 NOTE — DISCHARGE SUMMARY
[facility]  ED Observation Discharge Summary      Date of Admission:  11/8/2022  Date of Discharge:  11/9/2022  Discharging Provider: IBRAHIMA Maguire CNP  Discharge Service: ED Observation     Discharge Diagnoses   Ureteral Stone    Follow-ups Needed After Discharge   We recommend you follow up with PCP within one week regarding recent hospital admission. A referral for a PCP has been placed to help you establish care.     We recommend you follow up with urology in clinic in 1 week for stent removal. They will call you to schedule.       Discharge Disposition   Discharged to home  Condition at discharge: Stable    Hospital Course     Regan Gibson is a 22 year old adult admitted on 11/8/2022. She has a recent diagnosis of kidney stones who presents to the [facility] ED with right flank pain.       ##. Obstructing Stones (3mm & 4mm) in Proximal Right Ureter w/ Mild-to-Moderate Hydronephrosis: Right flank pain with radiation down the groin. Pain up to 10/10 however improved to 1/10 right now after receiving Toradol. Pain has not been controlled with Tylenol and ibuprofen at home. Did not tolerate oxycodone at recent admssion. Reports nausea, urinary frequency, urgency, and decreased urine stream, occasional mild hematuria, nausea.  Otherwise denies fever, chills, vomiting. Has been straining urine with some very small stone fragments. Was admitted ED observation unit on 11/3/2022 with similar symptoms and CT AP w/ 4 mm obstructing stone in proximal right ureter just below UPJ resulting in minimal right hydronephrosis; single 3 mm nonobstructing stone in the right kidney. Managed with IVF, Flomax and pain control with Toradol w/ improvement. Urology consulted and recommended discharge with trial of MET. In ED today, , /86, RR 19, SaO2 95% on RA, Temp 98.9  F. Normal CMP with bicarb 18 otherwise unremarkable.  Normal CBC with WBC 10.1. UA with  10 protein, moderate blood, 3 WBC, 20 RBC. Covid 19 PCR, Flu A/B, RSV negative. CT abdomen pelvis reports two tandem stones in proximal right ureter measuring 4 mm and 3 mm which cause mild-to-moderate hydronephrosis; nonobstructive nephrolithiasis on left. In ED patient was given 1 L NS bolus,  mL/h, Toradol 15 mg IV x1, Zofran 4 mg IV x1.  Per ED, patient d/w urology, who recommends admit to ED observation unit for pain control and they will see the patient in the AM. No events overnight. Patient reports right sided flank pain has improved. Patient underwent a cystoureteroscopy with ureteral stent insertion. See urology note for procedure details. Per urology, patient underwent procedure with no complications. Post procedure, urology recommends: Stent removal in clinic in 1 week (they will schedule), continue flomax daily (patient had flomax at home), Pyridium TID for three days, Oxybutin TID PRN, Dramamine as needed nightly for pain in addition to Tylenol and Ibuprofen. Prescriptions sent for stool softners, pyridium, oxybutin, and dramamine. PCP referral. Urology to schedule follow up for stent removal. At the time of discharge, patients VSS, pain well controlled, and patient verbalized understanding of follow up and discharge instructions. Stable for discharge.       Consultations This Hospital Stay   UROLOGY IP CONSULT  NURSING TO CONSULT FOR VASCULAR ACCESS CARE IP CONSULT  NURSING TO CONSULT FOR VASCULAR ACCESS CARE IP CONSULT    Code Status   Full Code    Time Spent on this Encounter   I, IBRAHIMA Maguire CNP, personally saw the patient today and spent less than or equal to 30 minutes discharging this patient.       IBRAHIMA Maguire CNP  [facility] MAIN OR  [address] [address]  Phone: [phone]  ______________________________________________________________________    Physical Exam   Vital Signs: Temp: 98.1  F (36.7  C) Temp src: Oral BP: 127/65 Pulse: 96   Resp: 17  SpO2: 99 % O2 Device: None (Room air)    Weight: 175 lbs .72 oz    Constitutional: awake, alert, cooperative, no apparent distress, and appears stated age  Eyes: Lids and lashes normal, pupils equal, round and reactive to light, extra ocular muscles intact, sclera clear, conjunctiva normal  ENT: Normocephalic, without obvious abnormality, atraumatic, sinuses nontender on palpation, external ears without lesions, oral pharynx with moist mucous membranes, tonsils without erythema or exudates, gums normal and good dentition.  Hematologic / Lymphatic: no cervical lymphadenopathy  Respiratory: No increased work of breathing, good air exchange, clear to auscultation bilaterally, no crackles or wheezing  Cardiovascular: Normal apical impulse, regular rate and rhythm, normal S1 and S2, no S3 or S4, and no murmur noted  GI: No scars, normal bowel sounds, soft, non-distended, right sided tenderness, no masses palpated, no hepatosplenomegally. Right CVA tenderness.   :  Mild right sided flank pain. Stent placed per urology this afternoon  Skin: no bruising or bleeding  Musculoskeletal: There is no redness, warmth, or swelling of the joints.  Full range of motion noted.  Motor strength is 5 out of 5 all extremities bilaterally.  Tone is normal.  Neurologic: Awake, alert, oriented to name, place and time.  Cranial nerves II-XII are grossly intact.  Motor is 5 out of 5 bilaterally.  Cerebellar finger to nose, heel to shin intact.  Sensory is intact.  Babinski down going, Romberg negative, and gait is normal.  Neuropsychiatric: General: normal, calm and normal eye contact          Primary Care Physician   Physician No Ref-Primary   PCP referral placed at discharge    Discharge Orders      Primary Care Referral      Reason for your hospital stay    Kidney Stone     Activity    Your activity upon discharge: activity as tolerated     Follow Up and recommended labs and tests    Follow up with primary care provider, Physician No  Ref-Primary, within 7 days for hospital follow- up.  The following labs/tests are recommended: CMP. A referral has been placed to help you establish care with a pcp    We recommend you follow up with urology in clinic in 1 week for stent removal.     When to contact your care team    Return to the ED with fever, uncontrolled nausea, vomiting, unrelieved pain, bleeding not relieved with pressure, dizziness, chest pain, shortness of breath, loss of consciousness, and any new or concerning symptoms.     Discharge Instructions    You were admitted to ED Observation for evaluation of abdominal pain. In the ED, your workup was negative with the exception of an abdominal CT that showed two tandem stones in proximal right ureter measuring 4mm and 3mm. Urology was consulted and a stent was placed. They recommend: daily flomax,  Oxybutin three times a day as needed for bladder spasms (always with stool softener- senna twice a day has been prescribed), Three times a day pyridium x 3 days, and dramamine at night for adjunctive pain control. Tylenol and ibuprofen are most effective  for pain control. You can continue to take Tylenol and Ibuprofen as needed for pain. Continue to take your flomax daily, pyridium 3x daily for 3 days, and oxybutin as needed up to 3 times a day. Follow up with urology in 1 week for stent removal. They should call you to schedule.     Diet    Follow this diet upon discharge: Regular         Significant Results and Procedures   Most Recent 3 CBC's:Recent Labs   Lab Test 11/08/22  2353 11/03/22  0755 11/03/22  0119   WBC 10.1 11.0 13.3*   HGB 13.3 12.9 13.8   MCV 91 93 92    247 257     Most Recent 3 BMP's:Recent Labs   Lab Test 11/09/22  1009 11/09/22  0838 11/08/22  2353 11/03/22  0755     --  136 139   POTASSIUM 3.8  --  3.9 3.9   CHLORIDE 108*  --  103 107   CO2 19*  --  18* 18*   BUN 11.7  --  13.6 12.7   CR 0.72  --  1.05 0.76   ANIONGAP 9  --  15 14   JOLIE 9.1  --  9.1 9.2   GLC 96 99  107* 141*     Most Recent 2 LFT's:Recent Labs   Lab Test 11/08/22  2353 11/02/22  2141   AST 27 48   ALT 30 22   ALKPHOS 53 54   BILITOTAL 0.2 0.3     Most Recent 3 INR's:No lab results found.  Most Recent INR's and Anticoagulation Dosing History:  Anticoagulation Dose History    There is no flowsheet data to display.       Most Recent 3 Creatinines:Recent Labs   Lab Test 11/09/22  1009 11/08/22  2353 11/03/22  0755   CR 0.72 1.05 0.76     Most Recent 3 Hemoglobins:Recent Labs   Lab Test 11/08/22  2353 11/03/22  0755 11/03/22  0119   HGB 13.3 12.9 13.8     Most Recent 3 Troponin's:No lab results found.  Most Recent 3 BNP's:No lab results found.  Most Recent D-dimer:No lab results found.  Most Recent Cholesterol Panel:No lab results found.  7-Day Micro Results     Collected Updated Procedure Result Status      11/09/2022 0254 11/09/2022 0440 Asymptomatic Influenza A/B & SARS-CoV2 (COVID-19) Virus PCR Multiplex Nasopharyngeal [[id number]]    Swab from Nasopharyngeal    Final result Component Value   Influenza A PCR Negative   Influenza B PCR Negative   RSV PCR Negative   SARS CoV2 PCR Negative   NEGATIVE: SARS-CoV-2 (COVID-19) RNA not detected, presumed negative.            11/03/2022 0123 11/03/2022 0223 Asymptomatic COVID-19 Virus (Coronavirus) by PCR Nasopharyngeal [[id number]]    Swab from Nasopharyngeal    Final result Component Value   SARS CoV2 PCR Negative   NEGATIVE: SARS-CoV-2 (COVID-19) RNA not detected, presumed negative.            11/02/2022 2143 11/04/2022 1245 Chlamydia trachomatis PCR [[id number]]   Urine, Voided    Final result Component Value   Chlamydia trachomatis Negative   A negative result by transcription mediated amplification does not preclude the presence of C. trachomatis infection because results are dependent on proper and adequate collection, absence of inhibitors and sufficient rRNA to be detected.            11/02/2022 2143 11/04/2022 1245 Neisseria gonorrhoea PCR [[id number]]    Urine, Voided    Final result Component Value   Neisseria gonorrhoeae Negative   Negative for N. gonorrhoeae rRNA by transcription mediated amplification. A negative result by transcription mediated amplification does not preclude the presence of C. trachomatis infection because results are dependent on proper and adequate collection, absence of inhibitors and sufficient rRNA to be detected.                Most Recent TSH and T4:No lab results found.  Most Recent Hemoglobin A1c:No lab results found.  Most Recent 6 glucoses:Recent Labs   Lab Test 11/09/22  1009 11/09/22  0838 11/08/22  2353 11/03/22  0755 11/02/22  2141   GLC 96 99 107* 141* 90     Most Recent Urinalysis:Recent Labs   Lab Test 11/08/22  2344   COLOR Light Yellow   APPEARANCE Clear   URINEGLC Negative   URINEBILI Negative   URINEKETONE Negative   SG 1.021   UBLD Moderate*   URINEPH 5.5   PROTEIN 10*   NITRITE Negative   LEUKEST Negative   RBCU 20*   WBCU 3     Most Recent ABG:No lab results found.  Most Recent ESR & CRP:No lab results found.,   Results for orders placed or performed during the hospital encounter of 11/08/22   CT Abdomen Pelvis w/o Contrast    Narrative    EXAM: CT ABDOMEN PELVIS W/O CONTRAST  LOCATION: [facility]  DATE/TIME: 11/9/2022 12:27 AM    INDICATION: flank pain, known stone  COMPARISON: 11/02/2022.  TECHNIQUE: CT scan of the abdomen and pelvis was performed without IV contrast. Multiplanar reformats were obtained. Dose reduction techniques were used.  CONTRAST: None.    FINDINGS:   LOWER CHEST: Normal.    HEPATOBILIARY: Normal.    PANCREAS: Normal.    SPLEEN: Normal.    ADRENAL GLANDS: Normal.    KIDNEYS/BLADDER: There are now two tandem stones in the proximal right ureter measuring 4 mm and 3 mm which cause mild-to-moderate hydronephrosis. No stones remain within the right renal collecting system. On the left, four nonobstructing intrarenal   stones measuring up to 3 mm in size.  Bladder is decompressed.    BOWEL: Normal. No obstruction or inflammation. Normal appendix.    LYMPH NODES: Normal.    VASCULATURE: Unremarkable.    PELVIC ORGANS: Normal.    MUSCULOSKELETAL: Normal.      Impression    IMPRESSION:   1.  There are now two tandem stones in the proximal right ureter measuring 4 mm and 3 mm which cause mild-to-moderate hydronephrosis.  2.  Nonobstructive nephrolithiasis on the left.     XR Surgery ERICK L/T 5 Min Fluoro w Stills    Narrative    This exam was marked as non-reportable because it will not be read by a   radiologist or a [place] non-radiologist provider.               Discharge Medications   Current Discharge Medication List      CONTINUE these medications which have NOT CHANGED    Details   acetaminophen (TYLENOL) 325 MG tablet Take 3 tablets (975 mg) by mouth every 6 hours      ibuprofen (ADVIL/MOTRIN) 200 MG tablet Take 3 tablets (600 mg) by mouth every 6 hours as needed for pain    Comments: Take this medication with meals      ondansetron (ZOFRAN ODT) 4 MG ODT tab Take 1 tablet (4 mg) by mouth every 6 hours as needed for nausea  Qty: 10 tablet, Refills: 0    Associated Diagnoses: Kidney stone      tamsulosin (FLOMAX) 0.4 MG capsule Take 1 capsule (0.4 mg) by mouth daily for 30 days  Qty: 30 capsule, Refills: 0    Associated Diagnoses: Kidney stone           Allergies   Allergies   Allergen Reactions     Ibuprofen Nausea

## 2022-11-09 NOTE — ANESTHESIA PREPROCEDURE EVALUATION
Anesthesia Pre-Procedure Evaluation    Patient: Regan Gibson   MRN: [id number] : 2000        Procedure : Procedure(s):  CYSTOURETEROSCOPY  LITHOTRIPSY, CALCULUS, URETER, USING HOLMIUM LASER, WITH URETERAL STENT INSERTION          No past medical history on file.   History reviewed. No pertinent surgical history.   Allergies   Allergen Reactions     Ibuprofen Nausea      Social History     Tobacco Use     Smoking status: Not on file     Smokeless tobacco: Not on file   Substance Use Topics     Alcohol use: Not on file      Wt Readings from Last 1 Encounters:   22 79.4 kg (175 lb 0.7 oz)        Anesthesia Evaluation   Pt has not had prior anesthetic         ROS/MED HX  ENT/Pulmonary:       Neurologic:       Cardiovascular:       METS/Exercise Tolerance:     Hematologic:       Musculoskeletal:       GI/Hepatic:     (+) GERD, Asymptomatic on medication,     Renal/Genitourinary:       Endo:     (+) Obesity,     Psychiatric/Substance Use:       Infectious Disease:       Malignancy:       Other:            Physical Exam    Airway        Mallampati: II    Neck ROM: limited     Respiratory Devices and Support         Dental  no notable dental history         Cardiovascular   cardiovascular exam normal          Pulmonary   pulmonary exam normal                OUTSIDE LABS:  CBC:   Lab Results   Component Value Date    WBC 10.1 2022    WBC 11.0 2022    HGB 13.3 2022    HGB 12.9 2022    HCT 39.0 2022    HCT 39.3 2022     2022     2022     BMP:   Lab Results   Component Value Date     2022     2022    POTASSIUM 3.9 2022    POTASSIUM 3.9 2022    CHLORIDE 103 2022    CHLORIDE 107 2022    CO2 18 (L) 2022    CO2 18 (L) 2022    BUN 13.6 2022    BUN 12.7 2022    CR 1.05 2022    CR 0.76 2022    GLC 99 2022     (H) 2022     COAGS: No results found for: PTT,  INR, FIBR  POC: No results found for: BGM, HCG, HCGS  HEPATIC:   Lab Results   Component Value Date    ALBUMIN 4.3 11/08/2022    PROTTOTAL 7.1 11/08/2022    ALT 30 11/08/2022    AST 27 11/08/2022    ALKPHOS 53 11/08/2022    BILITOTAL 0.2 11/08/2022     OTHER:   Lab Results   Component Value Date    LACT 1.5 11/03/2022    JOLIE 9.1 11/08/2022    LIPASE 69 (H) 11/02/2022       Anesthesia Plan    ASA Status:  2   NPO Status:  NPO Appropriate    Anesthesia Type: General.     - Airway: ETT   Induction: Intravenous.   Maintenance: Inhalation.   Techniques and Equipment:     - Airway: Video-Laryngoscope         Consents    Anesthesia Plan(s) and associated risks, benefits, and realistic alternatives discussed. Questions answered and patient/representative(s) expressed understanding.     - Discussed: Risks, Benefits and Alternatives for BOTH SEDATION and the PROCEDURE were discussed     - Discussed with:  Patient    Use of blood products discussed: Yes.     - Discussed with: Patient.     Postoperative Care    Pain management: IV analgesics.   PONV prophylaxis: Ondansetron (or other 5HT-3), Dexamethasone or Solumedrol     Comments:                Kody Weathers MD

## 2022-11-09 NOTE — ED PROVIDER NOTES
"    [facility]ERGENCY DEPARTMENT ([facility])  11/08/22  ED Provider Note  [facility]      History     Chief Complaint   Patient presents with     Flank Pain     HPI  Regan Gibson is a 22 year old adult with unknown past medical history who ***      Per chart review, patient is visiting from [place] (new to the [facility] system and has no past medical records here). She presented to [facility] ED on 11/02/22 with RLQ abdominal pain, R flank pain and hematuria. 4 mm kindey stone w/ hydronephrosis was observed on AP CT. Recommended treatment for 4mm stone was hydration, pain control, Flomax, and urine straining and obtain f/u imaging for stone passage which pt says she would prefer to do once returns to [place] in January as she does not have healthcare coverage here.    IMAGING 11/02/22:   --- CT A/P: \"1.  4 mm obstructing stone proximal right ureter resulting in minimal right hydronephrosis.  2.  No other acute findings.  3.  Additional nonobstructing intrarenal calculi bilaterally as above.\"        {Past History:863404}      Review of Systems  {Complete vs limited ROS:151610::\"A complete review of systems was performed with pertinent positives and negatives noted in the HPI, and all other systems negative.\"}    Physical Exam   BP: (!) 146/86  Pulse: 102  Temp: 98.9  F (37.2  C)  Resp: 19  Height: 180.3 cm (5' 10.98\")  Weight: 79.4 kg (175 lb 0.7 oz)  SpO2: 95 %  Physical Exam  ***  ED Course      Procedures       {ED Course Selections:246041}              No results found for any visits on 11/08/22.  Medications - No data to display     Assessments & Plan (with Medical Decision Making)   ***    I have reviewed the nursing notes. I have reviewed the findings, diagnosis, plan and need for follow up with the patient.    New Prescriptions    No medications on file       Final diagnoses:   None       --  Deisy Johsn***  [facility] EMERGENCY " DEPARTMENT  11/8/2022

## 2022-11-09 NOTE — H&P
[facility]    History and Physical - ED Observation Service      Date of Admission:  11/8/2022    Assessment & Plan      Regan Gibson is a 22 year old adult admitted on 11/8/2022. She has a recent diagnosis of kidney stones who presents to the [facility] ED with right flank pain.      ##. Obstructing Stones (3mm & 4mm) in Proximal Right Ureter w/ Mild-to-Moderate Hydronephrosis: Right flank pain with radiation down the groin. Pain up to 10/10 however improved to 1/10 right now after receiving Toradol. Pain has not been controlled with Tylenol and ibuprofen at home. Did not tolerate oxycodone at recent admssion. Reports nausea, urinary frequency, urgency, and decreased urine stream, occasional mild hematuria, nausea.  Otherwise denies fever, chills, vomiting. Has been straining urine with some very small stone fragments. Was admitted ED observation unit on 11/3/2022 with similar symptoms and CT AP w/ 4 mm obstructing stone in proximal right ureter just below UPJ resulting in minimal right hydronephrosis; single 3 mm nonobstructing stone in the right kidney. Managed with IVF, Flomax and pain control with Toradol w/ improvement. Urology consulted and recommended discharge with trial of MET. In ED today, , /86, RR 19, SaO2 95% on RA, Temp 98.9  F. Normal CMP with bicarb 18 otherwise unremarkable.  Normal CBC with WBC 10.1. UA with 10 protein, moderate blood, 3 WBC, 20 RBC. Covid 19 PCR, Flu A/B, RSV negative. CT abdomen pelvis reports two tandem stones in proximal right ureter measuring 4 mm and 3 mm which cause mild-to-moderate hydronephrosis; nonobstructive nephrolithiasis on left. In ED patient was given 1 L NS bolus,  mL/h, Toradol 15 mg IV x1, Zofran 4 mg IV x1.  Per ED, patient d/w urology, who recommends admit to ED observation unit for pain control and they will see the patient in the AM.  -Urology consult-appreciate recommendations  -Toradol  15 mg q 6 hours, Flomax daily, Scheduled Tylenol.   -Senna S1 tab daily  -Zofran prn  -N.p.o.  - mL/h  -Strict I & O  -Strain urine       Diet: NPO for Medical/Clinical Reasons Except for: Ice Chips    DVT Prophylaxis: Ambulate every shift  Alvarado Catheter: Not present  Central Lines: None  Cardiac Monitoring: None  Code Status: Full Code      Clinically Significant Risk Factors Present on Admission                              Disposition Plan      Expected Discharge Date: 11/10/2022                The patient's care was discussed with the Attending Physician, Dr. Chang.    SALVADOR Gentile   ED Observation Service   [facility]  Securely message with the Vocera Web Console (learn more here)  Text page via [facility] Paging/Directory   ______________________________________________________________________    Chief Complaint   Right flank pain    History is obtained from the patient    History of Present Illness   Regan Gibson is a 22 year old adult with a recent diagnosis of kidney stones who presents to the [facility] ED with right flank pain.  She reports pain is similar to previous however significantly worse.  Admits to radiation down the groin.  Patient reports upon arrival to the ED pain was 10/10 however currently much improved to 1/10 after taking Toradol. States her pain has not been well controlled with Tylenol and ibuprofen at home. She mentions during her recent admission she was given oxycodone which made her feel sick and has not been able to try anything stronger. Reports nausea, urinary frequency, urgency, and decreased urine stream, occasional mild hematuria, nausea.  Otherwise denies any fever, chills, vomiting. Has been straining her urine and has seen some very small stone fragments, but nothing approaching the expected size of her diagnosed 4 mm stone.      Per chart review patient was admitted to the ED observation unit on 11/3/2022  where she presented to the ED with acute right flank pain, lower abdominal discomfort, and hematuria.  At that time WBC was 13.3 -> 11.  UA with large blood, > 182 RBC.  CT abdomen pelvis reported 4 mm obstructing stone in the proximal right ureter just below the UPJ resulting in minimal right hydronephrosis; single 3 mm nonobstructing stone in the right kidney; left kidney with 4 nonobstructing stones measuring up to 4 mm in diameter.  She was managed with IVF, Flomax and pain control with Toradol.  Urology was consulted and upon evaluating patient's pain was well controlled on 1 dose of Toradol and patient thought she passed a stone.  Urology therefore recommended discharge with trial of MET.      In the ED, , /86, RR 19, SaO2 95% on RA, Temp 98.9  F. Labs show normal CMP with bicarb of 18 otherwise unremarkable.  Normal CBC with WBC 10.1.  UA with 10 protein, moderate blood, 3 WBC, 20 RBC. Covid 19 PCR, Flu A/B, RSV negative. CT abdomen pelvis reports two tandem stones in the proximal right ureter measuring 4 mm and 3 mm which cause mild-to-moderate hydronephrosis; nonobstructive nephrolithiasis on the left. In the ED the patient was given 1 L NS bolus,  mL/h, Toradol 15 mg IV x1, Zofran 4 mg IV x1.  Per ED, patient d/w urology, who recommends admit to ED observation unit for pain control and they will see the patient in the AM.    Review of Systems    All other ROS negative except those mentioned in above note.      Past Medical History    I have reviewed this patient's medical history and updated it with pertinent information if needed.   No past medical history on file.    Past Surgical History   I have reviewed this patient's surgical history and updated it with pertinent information if needed.  No past surgical history on file.    Social History   I have reviewed this patient's social history and updated it with pertinent information if needed.       Family History         Prior to  Admission Medications   Prior to Admission Medications   Prescriptions Last Dose Informant Patient Reported? Taking?   acetaminophen (TYLENOL) 325 MG tablet   Yes No   Sig: Take 3 tablets (975 mg) by mouth every 6 hours   ibuprofen (ADVIL/MOTRIN) 200 MG tablet   Yes No   Sig: Take 3 tablets (600 mg) by mouth every 6 hours as needed for pain   ondansetron (ZOFRAN ODT) 4 MG ODT tab   No No   Sig: Take 1 tablet (4 mg) by mouth every 6 hours as needed for nausea   tamsulosin (FLOMAX) 0.4 MG capsule   No No   Sig: Take 1 capsule (0.4 mg) by mouth daily for 30 days      Facility-Administered Medications: None     Allergies   Allergies   Allergen Reactions     Ibuprofen Nausea       Physical Exam   Vital Signs: Temp: 98.9  F (37.2  C) Temp src: Oral BP: (!) 146/86 Pulse: 102   Resp: 19 SpO2: 95 % O2 Device: None (Room air)    Weight: 175 lbs .72 oz    Constitutional: awake, alert, cooperative, no apparent distress, and appears stated age  Eyes: Lids and lashes normal, pupils equal, round and reactive to light, extra ocular muscles intact, sclera clear, conjunctiva normal  ENT: Normocephalic, without obvious abnormality, atraumatic, sinuses nontender on palpation, external ears without lesions, oral pharynx with moist mucous membranes, tonsils without erythema or exudates, gums normal and good dentition.  Hematologic / Lymphatic: no cervical lymphadenopathy  Respiratory: No increased work of breathing, good air exchange, clear to auscultation bilaterally, no crackles or wheezing  Cardiovascular: Normal apical impulse, regular rate and rhythm, normal S1 and S2, no S3 or S4, and no murmur noted  GI: No scars, normal bowel sounds, soft, non-distended, right sided tenderness, no masses palpated, no hepatosplenomegally. Right CVA tenderness.   Skin: no bruising or bleeding  Musculoskeletal: There is no redness, warmth, or swelling of the joints.  Full range of motion noted.  Motor strength is 5 out of 5 all extremities  bilaterally.  Tone is normal.  Neurologic: Awake, alert, oriented to name, place and time.  Cranial nerves II-XII are grossly intact.  Motor is 5 out of 5 bilaterally.  Cerebellar finger to nose, heel to shin intact.  Sensory is intact.  Babinski down going, Romberg negative, and gait is normal.  Neuropsychiatric: General: normal, calm and normal eye contact    Data   Data reviewed today: I reviewed all medications, new labs and imaging results over the last 24 hours. I personally reviewed     Recent Labs   Lab 11/08/22 2353 11/03/22 0755 11/03/22 0119 11/02/22 2141   WBC 10.1 11.0 13.3*  --    HGB 13.3 12.9 13.8  --    MCV 91 93 92  --     247 257  --     139  --  138   POTASSIUM 3.9 3.9  --  4.4   CHLORIDE 103 107  --  105   CO2 18* 18*  --  20*   BUN 13.6 12.7  --  9.7   CR 1.05 0.76  --  0.65   ANIONGAP 15 14  --  13   JOLIE 9.1 9.2  --  9.4   * 141*  --  90   ALBUMIN 4.3  --   --  4.4   PROTTOTAL 7.1  --   --  7.2   BILITOTAL 0.2  --   --  0.3   ALKPHOS 53  --   --  54   ALT 30  --   --  22   AST 27  --   --  48   LIPASE  --   --   --  69*     Most Recent 3 CBC's:Recent Labs   Lab Test 11/08/22 2353 11/03/22 0755 11/03/22 0119   WBC 10.1 11.0 13.3*   HGB 13.3 12.9 13.8   MCV 91 93 92    247 257     Most Recent 3 BMP's:Recent Labs   Lab Test 11/08/22 2353 11/03/22 0755 11/02/22 2141    139 138   POTASSIUM 3.9 3.9 4.4   CHLORIDE 103 107 105   CO2 18* 18* 20*   BUN 13.6 12.7 9.7   CR 1.05 0.76 0.65   ANIONGAP 15 14 13   JOLIE 9.1 9.2 9.4   * 141* 90     Most Recent 2 LFT's:Recent Labs   Lab Test 11/08/22  2353 11/02/22  2141   AST 27 48   ALT 30 22   ALKPHOS 53 54   BILITOTAL 0.2 0.3     Most Recent 3 Creatinines:Recent Labs   Lab Test 11/08/22  2353 11/03/22  0755 11/02/22  2141   CR 1.05 0.76 0.65     Most Recent 3 Hemoglobins:Recent Labs   Lab Test 11/08/22  2353 11/03/22  0755 11/03/22  0119   HGB 13.3 12.9 13.8     Most Recent Urinalysis:Recent Labs   Lab Test  11/08/22  2344   COLOR Light Yellow   APPEARANCE Clear   URINEGLC Negative   URINEBILI Negative   URINEKETONE Negative   SG 1.021   UBLD Moderate*   URINEPH 5.5   PROTEIN 10*   NITRITE Negative   LEUKEST Negative   RBCU 20*   WBCU 3     10.1    \    13.3    /    227   N 76    L N/A    136    103    13.6 /   ------------------------------------ 107 (H)   ALT 30   AST 27   AP 53   ALB 4.3   Ca 9.1  3.9    18 (L)    1.05 \    % RETIC N/A    LDH N/A  Troponin N/A    BNP N/A    CK N/A  INR N/A   PTT N/A    D-dimer N/A    Fibrinogen N/A    Antithrombin N/A  Ferritin N/A  CRP N/A    IL-6 N/A  Recent Results (from the past 24 hour(s))   CT Abdomen Pelvis w/o Contrast    Narrative    EXAM: CT ABDOMEN PELVIS W/O CONTRAST  LOCATION: [facility]  DATE/TIME: 11/9/2022 12:27 AM    INDICATION: flank pain, known stone  COMPARISON: 11/02/2022.  TECHNIQUE: CT scan of the abdomen and pelvis was performed without IV contrast. Multiplanar reformats were obtained. Dose reduction techniques were used.  CONTRAST: None.    FINDINGS:   LOWER CHEST: Normal.    HEPATOBILIARY: Normal.    PANCREAS: Normal.    SPLEEN: Normal.    ADRENAL GLANDS: Normal.    KIDNEYS/BLADDER: There are now two tandem stones in the proximal right ureter measuring 4 mm and 3 mm which cause mild-to-moderate hydronephrosis. No stones remain within the right renal collecting system. On the left, four nonobstructing intrarenal   stones measuring up to 3 mm in size. Bladder is decompressed.    BOWEL: Normal. No obstruction or inflammation. Normal appendix.    LYMPH NODES: Normal.    VASCULATURE: Unremarkable.    PELVIC ORGANS: Normal.    MUSCULOSKELETAL: Normal.      Impression    IMPRESSION:   1.  There are now two tandem stones in the proximal right ureter measuring 4 mm and 3 mm which cause mild-to-moderate hydronephrosis.  2.  Nonobstructive nephrolithiasis on the left.

## 2022-11-09 NOTE — ED PROVIDER NOTES
History     Chief Complaint   Patient presents with     Flank Pain     HPI  Nathanjaxson (Dana) CLAUDIA Gibson is a 22 year old adult with a past medical history of recently diagnosed kidney stone who presents to the emergency department with a chief complaint of flank pain.  Located in the right flank.  Similar to pain experienced prior to previous ER visit during which she was diagnosed with a 4 mm right proximal ureteral stone.  Discharged home with Flomax and pain medications.  Has been straining her urine and has seen some very small stone fragments, but nothing approaching the expected size of her diagnosed 4 mm stone.  Pain is not well controlled at home.    EXAM: CT ABDOMEN PELVIS W/O CONTRAST 11/2/2022 10:36 PM                                                                   IMPRESSION:   1.  4 mm obstructing stone proximal right ureter resulting in minimal right hydronephrosis.  2.  No other acute findings.  3.  Additional nonobstructing intrarenal calculi bilaterally as above.    I have reviewed the Medications, Allergies, Past Medical and Surgical History, and Social History in the Epic system.    No past medical history on file.  No past surgical history on file.  Current Facility-Administered Medications   Medication     0.9% sodium chloride BOLUS    Followed by     [START ON 11/9/2022] sodium chloride 0.9% infusion     ketorolac (TORADOL) injection 15 mg     ondansetron (ZOFRAN) injection 4 mg     Current Outpatient Medications   Medication     acetaminophen (TYLENOL) 325 MG tablet     ibuprofen (ADVIL/MOTRIN) 200 MG tablet     ondansetron (ZOFRAN ODT) 4 MG ODT tab     tamsulosin (FLOMAX) 0.4 MG capsule     Allergies   Allergen Reactions     Ibuprofen Nausea     Past medical history, past surgical history, medications, and allergies were reviewed with the patient. Additional pertinent items: None    Social History     Socioeconomic History     Marital status: Single     Spouse name: Not on file     Number of  "children: Not on file     Years of education: Not on file     Highest education level: Not on file   Occupational History     Not on file   Tobacco Use     Smoking status: Not on file     Smokeless tobacco: Not on file   Substance and Sexual Activity     Alcohol use: Not on file     Drug use: Not on file     Sexual activity: Not on file   Other Topics Concern     Not on file   Social History Narrative     Not on file     Social Determinants of Health     Financial Resource Strain: Not on file   Food Insecurity: Not on file   Transportation Needs: Not on file   Physical Activity: Not on file   Stress: Not on file   Social Connections: Not on file   Intimate Partner Violence: Not on file   Housing Stability: Not on file     Social history was reviewed with the patient. Additional pertinent items: None    Review of Systems  General: No fevers or chills  Skin: No rash or diaphoresis  Eyes: No eye redness or discharge  Ears/Nose/Throat: No rhinorrhea or nasal congestion  Respiratory: No cough or SOB  Cardiovascular: No chest pain or palpitations  Gastrointestinal: No nausea, vomiting, or diarrhea  Genitourinary: No urinary frequency, hematuria, or dysuria, positive for flank pain  Musculoskeletal: No arthralgias or myalgias  Neurologic: No numbness or weakness  Hematologic/Lymphatic/Immunologic: No leg swelling, no easy bruising/bleeding  Endocrine: No polyuria/polydypsia    A complete review of systems was performed with pertinent positives and negatives noted in the HPI, and all other systems negative.    Physical Exam   BP: (!) 146/86  Pulse: 102  Temp: 98.9  F (37.2  C)  Resp: 19  Height: 180.3 cm (5' 10.98\")  Weight: 79.4 kg (175 lb 0.7 oz)  SpO2: 95 %      General: Well nourished, well developed, NAD  HEENT: EOMI, anicteric. NCAT, MMM  Neck: no jugular venous distension, supple, nl ROM  Cardiac: Regular rate and rhythm. No murmurs, rubs, or gallops. Normal S1, S2.  Intact peripheral pulses  Pulm: CTAB, no stridor, " wheezes, rales, rhonchi  Abd: Soft, nontender, nondistended.  No masses palpated.    Skin: Warm and dry to the touch.  No rash  Extremities: No LE edema, no cyanosis, w/w/p  Neuro: A&Ox3, no gross focal deficits    ED Course        Procedures                          Labs Ordered and Resulted from Time of ED Arrival to Time of ED Departure   COMPREHENSIVE METABOLIC PANEL - Abnormal       Result Value    Sodium 136      Potassium 3.9      Chloride 103      Carbon Dioxide (CO2) 18 (*)     Anion Gap 15      Urea Nitrogen 13.6      Creatinine 1.05      Calcium 9.1      Glucose 107 (*)     Alkaline Phosphatase 53      AST 27      ALT 30      Protein Total 7.1      Albumin 4.3      Bilirubin Total 0.2      GFR Estimate >90     ROUTINE UA WITH MICROSCOPIC REFLEX TO CULTURE - Abnormal    Color Urine Light Yellow      Appearance Urine Clear      Glucose Urine Negative      Bilirubin Urine Negative      Ketones Urine Negative      Specific Gravity Urine 1.021      Blood Urine Moderate (*)     pH Urine 5.5      Protein Albumin Urine 10 (*)     Urobilinogen Urine Normal      Nitrite Urine Negative      Leukocyte Esterase Urine Negative      Mucus Urine Present (*)     RBC Urine 20 (*)     WBC Urine 3      Squamous Epithelials Urine <1     CBC WITH PLATELETS AND DIFFERENTIAL    WBC Count 10.1      RBC Count 4.31      Hemoglobin 13.3      Hematocrit 39.0      MCV 91      MCH 30.9      MCHC 34.1      RDW 12.2      Platelet Count 227      % Neutrophils 76      % Lymphocytes 16      % Monocytes 6      % Eosinophils 1      % Basophils 0      % Immature Granulocytes 1      NRBCs per 100 WBC 0      Absolute Neutrophils 7.7      Absolute Lymphocytes 1.6      Absolute Monocytes 0.7      Absolute Eosinophils 0.1      Absolute Basophils 0.0      Absolute Immature Granulocytes 0.1      Absolute NRBCs 0.0              Results for orders placed or performed during the hospital encounter of 11/08/22 (from the past 24 hour(s))   UA with  Microscopic reflex to Culture    Specimen: Urine, Midstream   Result Value Ref Range    Color Urine Light Yellow Colorless, Straw, Light Yellow, Yellow    Appearance Urine Clear Clear    Glucose Urine Negative Negative mg/dL    Bilirubin Urine Negative Negative    Ketones Urine Negative Negative mg/dL    Specific Gravity Urine 1.021 1.003 - 1.035    Blood Urine Moderate (A) Negative    pH Urine 5.5 5.0 - 7.0    Protein Albumin Urine 10 (A) Negative mg/dL    Urobilinogen Urine Normal Normal, 2.0 mg/dL    Nitrite Urine Negative Negative    Leukocyte Esterase Urine Negative Negative    Mucus Urine Present (A) None Seen /LPF    RBC Urine 20 (H) <=2 /HPF    WBC Urine 3 <=5 /HPF    Squamous Epithelials Urine <1 <=1 /HPF    Narrative    Urine Culture not indicated   [place] Draw    Narrative    The following orders were created for panel order [place] Draw.  Procedure                               Abnormality         Status                     ---------                               -----------         ------                     Extra Blue Top Tube[[id number]]                              Final result               Extra Red Top Tube[[id number]]                               Final result               Extra Green Top (Lithium...[[id number]]                      Final result               Extra Purple Top Tube[[id number]]                            Final result                 Please view results for these tests on the individual orders.   CBC with platelets differential    Narrative    The following orders were created for panel order CBC with platelets differential.  Procedure                               Abnormality         Status                     ---------                               -----------         ------                     CBC with platelets and d...[[id number]]                      Final result                 Please view results for these tests on the individual orders.   Comprehensive metabolic panel   Result  Value Ref Range    Sodium 136 136 - 145 mmol/L    Potassium 3.9 3.4 - 5.3 mmol/L    Chloride 103 98 - 107 mmol/L    Carbon Dioxide (CO2) 18 (L) 22 - 29 mmol/L    Anion Gap 15 7 - 15 mmol/L    Urea Nitrogen 13.6 6.0 - 20.0 mg/dL    Creatinine 1.05 0.51 - 1.17 mg/dL    Calcium 9.1 8.6 - 10.0 mg/dL    Glucose 107 (H) 70 - 99 mg/dL    Alkaline Phosphatase 53 35 - 129 U/L    AST 27 10 - 50 U/L    ALT 30 10 - 50 U/L    Protein Total 7.1 6.4 - 8.3 g/dL    Albumin 4.3 3.5 - 5.2 g/dL    Bilirubin Total 0.2 <=1.2 mg/dL    GFR Estimate >90 >60 mL/min/1.73m2    Narrative    The sex of this patient cannot be reliably determined based on discrepancies in demographics (legal sex, sex assigned at birth, gender identity).  Both male and female reference ranges are provided where applicable.  Careful evaluation of the patient s results as compared to the gender specific reference intervals is required in this setting.    Extra Blue Top Tube   Result Value Ref Range    Hold Specimen JIC    Extra Red Top Tube   Result Value Ref Range    Hold Specimen JIC    Extra Green Top (Lithium Heparin) Tube   Result Value Ref Range    Hold Specimen JIC    Extra Purple Top Tube   Result Value Ref Range    Hold Specimen JIC    CBC with platelets and differential   Result Value Ref Range    WBC Count 10.1 4.0 - 11.0 10e3/uL    RBC Count 4.31 3.80 - 5.90 10e6/uL    Hemoglobin 13.3 11.7 - 17.7 g/dL    Hematocrit 39.0 35.0 - 53.0 %    MCV 91 78 - 100 fL    MCH 30.9 26.5 - 33.0 pg    MCHC 34.1 31.5 - 36.5 g/dL    RDW 12.2 10.0 - 15.0 %    Platelet Count 227 150 - 450 10e3/uL    % Neutrophils 76 %    % Lymphocytes 16 %    % Monocytes 6 %    % Eosinophils 1 %    % Basophils 0 %    % Immature Granulocytes 1 %    NRBCs per 100 WBC 0 <1 /100    Absolute Neutrophils 7.7 1.6 - 8.3 10e3/uL    Absolute Lymphocytes 1.6 0.8 - 5.3 10e3/uL    Absolute Monocytes 0.7 0.0 - 1.3 10e3/uL    Absolute Eosinophils 0.1 0.0 - 0.7 10e3/uL    Absolute Basophils 0.0 0.0 - 0.2  10e3/uL    Absolute Immature Granulocytes 0.1 <=0.4 10e3/uL    Absolute NRBCs 0.0 10e3/uL    Narrative    The sex of this patient cannot be reliably determined based on discrepancies in demographics (legal sex, sex assigned at birth, gender identity).  Both male and female reference ranges are provided where applicable.  Careful evaluation of the patient s results as compared to the gender specific reference intervals is required in this setting.    CT Abdomen Pelvis w/o Contrast    Narrative    EXAM: CT ABDOMEN PELVIS W/O CONTRAST  LOCATION: [facility]  DATE/TIME: 11/9/2022 12:27 AM    INDICATION: flank pain, known stone  COMPARISON: 11/02/2022.  TECHNIQUE: CT scan of the abdomen and pelvis was performed without IV contrast. Multiplanar reformats were obtained. Dose reduction techniques were used.  CONTRAST: None.    FINDINGS:   LOWER CHEST: Normal.    HEPATOBILIARY: Normal.    PANCREAS: Normal.    SPLEEN: Normal.    ADRENAL GLANDS: Normal.    KIDNEYS/BLADDER: There are now two tandem stones in the proximal right ureter measuring 4 mm and 3 mm which cause mild-to-moderate hydronephrosis. No stones remain within the right renal collecting system. On the left, four nonobstructing intrarenal   stones measuring up to 3 mm in size. Bladder is decompressed.    BOWEL: Normal. No obstruction or inflammation. Normal appendix.    LYMPH NODES: Normal.    VASCULATURE: Unremarkable.    PELVIC ORGANS: Normal.    MUSCULOSKELETAL: Normal.      Impression    IMPRESSION:   1.  There are now two tandem stones in the proximal right ureter measuring 4 mm and 3 mm which cause mild-to-moderate hydronephrosis.  2.  Nonobstructive nephrolithiasis on the left.         Labs, vital signs, and imaging studies were reviewed by me.    Medications   0.9% sodium chloride BOLUS (1,000 mLs Intravenous New Bag 11/8/22 9473)     Followed by   sodium chloride 0.9% infusion ( Intravenous New Bag 11/9/22  0122)   ondansetron (ZOFRAN) injection 4 mg (4 mg Intravenous Given 11/8/22 2448)   ketorolac (TORADOL) injection 15 mg (15 mg Intravenous Given 11/8/22 2358)       Assessments & Plan (with Medical Decision Making)   The patient is a 22 year old adult who presents to the emergency department with flank pain in the setting of known ureteral stone.  Differential diagnosis includes pain secondary to known stone, urinary tract infection, musculoskeletal pain.  Labs, urinalysis ordered to further evaluate the patient.  IV fluids, Zofran and Toradol ordered for symptomatic relief in the emergency department.    2345 Pt d/w urology, they recommend repeat CT, agree with plan to admit to ED obs for pain control, they will see the patient in the AM if admitted    Laboratory work-up is remarkable for normal white blood cell count, normal creatinine, urinalysis does not appear consistent with UTI, appears improved compared to prior urinalysis    I have reviewed the nursing notes.    I have reviewed the findings, diagnosis, plan and need for follow up with the patient.    Patient discussed with ED observation unit, to be admitted to their service for further management. Plan was discussed with patient who understands and agrees with plan.    New Prescriptions    No medications on file       Final diagnoses:   Right ureteral stone     Deisy Johns MD  11/8/2022   [facility] EMERGENCY DEPARTMENT     Deisy Johns MD  11/09/22 0140

## 2022-11-09 NOTE — ANESTHESIA CARE TRANSFER NOTE
Patient: Regan Gibson    Procedure: Procedure(s):  CYSTOURETEROSCOPY  LITHOTRIPSY, CALCULUS, URETER, USING HOLMIUM LASER, WITH URETERAL STENT INSERTION       Diagnosis: Ureteral stone [N20.1]  Diagnosis Additional Information: No value filed.    Anesthesia Type:   General     Note:    Oropharynx: oropharynx clear of all foreign objects and spontaneously breathing  Level of Consciousness: awake  Oxygen Supplementation: face mask  Level of Supplemental Oxygen (L/min / FiO2): 6  Independent Airway: airway patency satisfactory and stable  Dentition: dentition unchanged  Vital Signs Stable: post-procedure vital signs reviewed and stable  Report to RN Given: handoff report given  Patient transferred to: PACU    Handoff Report: Identifed the Patient, Identified the Reponsible Provider, Reviewed the pertinent medical history, Discussed the surgical course, Reviewed Intra-OP anesthesia mangement and issues during anesthesia, Set expectations for post-procedure period and Allowed opportunity for questions and acknowledgement of understanding      Vitals:  Vitals Value Taken Time   /75 11/09/22 1515   Temp 36.7  C (98.1  F) 11/09/22 1315   Pulse 90 11/09/22 1516   Resp 18 11/09/22 1516   SpO2 99 % 11/09/22 1516   Vitals shown include unvalidated device data.    Electronically Signed By: IBRAHIMA Mccloud CRNA  November 9, 2022  3:17 PM

## 2022-11-09 NOTE — TELEPHONE ENCOUNTER
Reason for visit: Cystoscopy with stent removal     Relevant information: stone removal with Dr. Nancy Encarnacion out    Records/imaging/labs/orders: all records available    Pt called: No need for a call    At Rooming: give pt stent information at discharge    Kayla oLwe CMA  11/9/2022  2:15 PM

## 2022-11-09 NOTE — OR NURSING
Reported to Dr. Meredith:     1300 Pt bleeding small amount of blood and clots, reporting 4/10 pain.  Dr returned call immediately.     1400 Pt ARUNA Gibson Pt voided, large amount of demetrius blood in toilet, pt reporting 6/10 pain while voiding.  Jazmine RN PACU [phone]

## 2022-11-09 NOTE — OR NURSING
Dr Weathers notified of pain/cramping on R ABD.  See MAR for intervention.    Dr. Reveles notified of patient's report of R calf discomfort when moving; Dr saw patient at bedside.

## 2022-11-09 NOTE — PROGRESS NOTES
The patient was admitted for right flank pain.  They had a CT done on 11/2/22 which showed a 4 mm obstructing stone proximal right ureter with minimal right hydronephrosis,with single 3 mm nonobstructing stone right kidney.  They had a ct done yesterday which showed there are now two tandem stones in the proximal right ureter measuring 4 mm and 3 mm which cause mild-to-moderate hydronephrosis.  They are resting comfortably this morning and receiving toradol for pain.  They were seen by urology who wants to take the patient to the OR today for stent placement and discharge home later today.  Vitals stable and afebrile.  Wbc 10.1.  Creatinine 1.05.

## 2022-11-10 ENCOUNTER — PATIENT OUTREACH (OUTPATIENT)
Dept: CARE COORDINATION | Facility: CLINIC | Age: 22
End: 2022-11-10

## 2022-11-10 NOTE — PROGRESS NOTES
Clinic Care Coordination Contact  [facility]/Voicemail       Clinical Data: Care Coordinator Outreach  Outreach attempted x 1.Unable to dial out to patient or leave message on patient's voicemail with call back information and requested return call.  Plan: Care Coordinator will do no further outreaches at this time.    NIA Johnson  [phone]  [facility]

## 2022-11-14 ENCOUNTER — OFFICE VISIT (OUTPATIENT)
Dept: UROLOGY | Facility: CLINIC | Age: 22
End: 2022-11-14
Payer: COMMERCIAL

## 2022-11-14 ENCOUNTER — HOSPITAL ENCOUNTER (EMERGENCY)
Facility: CLINIC | Age: 22
Discharge: HOME | End: 2022-11-15
Attending: EMERGENCY MEDICINE | Admitting: EMERGENCY MEDICINE

## 2022-11-14 DIAGNOSIS — N20.1 URETERAL STONE: ICD-10-CM

## 2022-11-14 DIAGNOSIS — R10.9 FLANK PAIN: ICD-10-CM

## 2022-11-14 DIAGNOSIS — N20.1 URETEROLITHIASIS: ICD-10-CM

## 2022-11-14 DIAGNOSIS — N20.0 NEPHROLITHIASIS: Primary | ICD-10-CM

## 2022-11-14 DIAGNOSIS — N20.0 KIDNEY STONE: ICD-10-CM

## 2022-11-14 PROCEDURE — 76775 US EXAM ABDO BACK WALL LIM: CPT | Mod: 26 | Performed by: EMERGENCY MEDICINE

## 2022-11-14 PROCEDURE — 250N000013 HC RX MED GY IP 250 OP 250 PS 637: Performed by: EMERGENCY MEDICINE

## 2022-11-14 PROCEDURE — 99285 EMERGENCY DEPT VISIT HI MDM: CPT | Mod: 25 | Performed by: EMERGENCY MEDICINE

## 2022-11-14 PROCEDURE — 52310 CYSTOSCOPY AND TREATMENT: CPT | Performed by: UROLOGY

## 2022-11-14 PROCEDURE — 76775 US EXAM ABDO BACK WALL LIM: CPT | Performed by: EMERGENCY MEDICINE

## 2022-11-14 RX ORDER — CIPROFLOXACIN 500 MG/1
500 TABLET, FILM COATED ORAL ONCE
Status: COMPLETED | OUTPATIENT
Start: 2022-11-14 | End: 2022-11-14

## 2022-11-14 RX ORDER — NAPROXEN 500 MG/1
500 TABLET ORAL ONCE
Status: COMPLETED | OUTPATIENT
Start: 2022-11-14 | End: 2022-11-14

## 2022-11-14 RX ADMIN — CIPROFLOXACIN 500 MG: 500 TABLET, FILM COATED ORAL at 16:31

## 2022-11-14 RX ADMIN — NAPROXEN 500 MG: 500 TABLET ORAL at 23:58

## 2022-11-14 NOTE — LETTER
11/14/2022       RE: Regan Gibson  [address] [place] [facility] [address]     Dear Colleague,    Thank you for referring your patient, Regan Gibson, to the [facility] [place] at [facility]. Please see a copy of my visit note below.    Urology Procedure Note     Pre-operative diagnosis: Nephrolithiasis, hematuria   Post-operative diagnosis: Same   Procedure: Cystourethroscopy with removal of indwelling ureteral stent     Surgeon: Danish Romano MD, MS           Indications: Dana Gibson is a 22 year old trans-female without any genital recon with right ureteral stent after right URS. Pain is minimal now. Has hematuria but that is improving. Currently dark pink to light fruit punch in color.     Procedure: Regan Gibson was taken to the procedure room.   She was positioned in lithotomy and genitalia were prepped and draped in the standard fashion.      A flex cystoscope was introduced through the urethra into the urinary bladder.  The urethra was negative.  Within the urinary bladder, there was not evidence of stones, tumors, or growths. Urine was as above. Had to rinse bladder a couple of times to see stent.  The stent was visualized emanating from right UO, grasped with a grasping forceps and removed intact without difficulty.    One tablet of Cipro was given for antibiotic coverage.    F/U: if hematuria worsens or new flank pain or fever then call clinic or go to ER. This is first stone so no other F/U.              Again, thank you for allowing me to participate in the care of your patient.      Sincerely,    Danish Romano MD

## 2022-11-14 NOTE — NURSING NOTE
Chief Complaint   Patient presents with     Cystoscopy       There were no vitals taken for this visit. There is no height or weight on file to calculate BMI.    There is no problem list on file for this patient.      Allergies   Allergen Reactions     Ibuprofen Nausea       Current Outpatient Medications   Medication Sig Dispense Refill     acetaminophen (TYLENOL) 325 MG tablet Take 3 tablets (975 mg) by mouth every 6 hours       dimenhyDRINATE (DRAMAMINE) 50 MG tablet Take 1 tablet (50 mg) by mouth nightly as needed for sleep 7 tablet 0     ibuprofen (ADVIL/MOTRIN) 200 MG tablet Take 3 tablets (600 mg) by mouth every 6 hours as needed for pain       ondansetron (ZOFRAN ODT) 4 MG ODT tab Take 1 tablet (4 mg) by mouth every 6 hours as needed for nausea 10 tablet 0     oxybutynin (DITROPAN) 5 MG tablet Take 1 tablet (5 mg) by mouth 3 times daily as needed for bladder spasms 15 tablet 0     SENNA-docusate sodium (SENNA S) 8.6-50 MG tablet Take 1 tablet by mouth 2 times daily 30 tablet 0     tamsulosin (FLOMAX) 0.4 MG capsule Take 1 capsule (0.4 mg) by mouth daily for 30 days 30 capsule 0            Invasive Procedure Safety Checklist:    Procedure: Cystoscopy    Action: Complete sections and checkboxes as appropriate.    Pre-procedure:  1. Patient ID Verified with 2 identifiers (Devika and  or MRN) : YES    2. Procedure and site verified with patient/designee (when able) : YES    3. Accurate consent documentation in medical record : YES    4. H&P (or appropriate assessment) documented in medical record : N/A  H&P must be up to 30 days prior to procedure an updated within 24 hours of                 Procedure as applicable.     5. Relevant diagnostic and radiology test results appropriately labeled and displayed as applicable : YES    6. Blood products, implants, devices, and/or special equipment available for the procedure as applicable : YES    7. Procedure site(s) marked with provider initials [Exclusions: none] :  NO    8. Marking not required. Reason : Yes  Procedure does not require site marking    Time Out:     Time-Out performed immediately prior to starting procedure, including verbal and active participation of all team members addressing: YES    1. Correct patient identity.  2. Confirmed that the correct side and site are marked.  3. An accurate procedure to be done.  4. Agreement on the procedure to be done.  5. Correct patient position.  6. Relevant images and results are properly labeled and appropriately displayed.  7. The need to administer antibiotics or fluids for irrigation purposes during the procedure as applicable.  8. Safety precautions based on patient history or medication use.    During Procedure: Verification of correct person, site, and procedure occurs any time the responsibility for care of the patient is transferred to another member of the care team.    The following medication was given:     MEDICATION:  Ciprofloxacin  ROUTE: PO  SITE: taken by mouth  DOSE: 500mg  LOT #: [id number]  : [employer]  EXPIRATION DATE: 10/2023  NDC#: 4484-4397-74   Was there drug waste? No      Prior to med admin, verified patient identity using patient's name and date of birth.  Due to med administration, patient instructed to remain in clinic for 15 minutes  afterwards, and to report any adverse reaction to me immediately.    Drug Amount Wasted:  None.  Vial/Syringe: Syringe      Kiko Sotelo EMT-P  11/14/2022  4:31 PM

## 2022-11-14 NOTE — LETTER
Date:November 18, 2022      Patient was self referred, no letter generated. Do not send.        [facility] Information

## 2022-11-15 ENCOUNTER — TELEPHONE (OUTPATIENT)
Dept: UROLOGY | Facility: CLINIC | Age: 22
End: 2022-11-15

## 2022-11-15 ENCOUNTER — ANESTHESIA EVENT (OUTPATIENT)
Dept: SURGERY | Facility: CLINIC | Age: 22
End: 2022-11-15

## 2022-11-15 ENCOUNTER — APPOINTMENT (OUTPATIENT)
Dept: CT IMAGING | Facility: CLINIC | Age: 22
End: 2022-11-15
Attending: EMERGENCY MEDICINE

## 2022-11-15 VITALS
RESPIRATION RATE: 16 BRPM | HEART RATE: 76 BPM | SYSTOLIC BLOOD PRESSURE: 125 MMHG | OXYGEN SATURATION: 99 % | TEMPERATURE: 98 F | DIASTOLIC BLOOD PRESSURE: 68 MMHG

## 2022-11-15 DIAGNOSIS — N20.0 BILATERAL KIDNEY STONES: Primary | ICD-10-CM

## 2022-11-15 LAB
ALBUMIN UR-MCNC: 20 MG/DL
ANION GAP SERPL CALCULATED.3IONS-SCNC: 16 MMOL/L (ref 7–15)
APPEARANCE UR: CLEAR
BACTERIA #/AREA URNS HPF: ABNORMAL /HPF
BILIRUB UR QL STRIP: NEGATIVE
BUN SERPL-MCNC: 15.7 MG/DL (ref 6–20)
CALCIUM SERPL-MCNC: 9.3 MG/DL (ref 8.6–10)
CHLORIDE SERPL-SCNC: 104 MMOL/L (ref 98–107)
COLOR UR AUTO: ABNORMAL
CREAT SERPL-MCNC: 1.24 MG/DL (ref 0.51–1.17)
CREAT SERPL-MCNC: 1.38 MG/DL (ref 0.51–1.17)
DEPRECATED HCO3 PLAS-SCNC: 15 MMOL/L (ref 22–29)
GFR SERPL CREATININE-BSD FRML MDRD: 73 ML/MIN/1.73M2
GFR SERPL CREATININE-BSD FRML MDRD: 83 ML/MIN/1.73M2
GLUCOSE SERPL-MCNC: 96 MG/DL (ref 70–99)
GLUCOSE UR STRIP-MCNC: NEGATIVE MG/DL
HGB UR QL STRIP: ABNORMAL
KETONES UR STRIP-MCNC: 10 MG/DL
LEUKOCYTE ESTERASE UR QL STRIP: ABNORMAL
MUCOUS THREADS #/AREA URNS LPF: PRESENT /LPF
NITRATE UR QL: NEGATIVE
PH UR STRIP: 5 [PH] (ref 5–7)
POTASSIUM SERPL-SCNC: 3.7 MMOL/L (ref 3.4–5.3)
RBC URINE: >182 /HPF
SARS-COV-2 RNA RESP QL NAA+PROBE: NEGATIVE
SODIUM SERPL-SCNC: 135 MMOL/L (ref 136–145)
SP GR UR STRIP: 1.01 (ref 1–1.03)
UROBILINOGEN UR STRIP-MCNC: NORMAL MG/DL
WBC URINE: 65 /HPF

## 2022-11-15 PROCEDURE — 87086 URINE CULTURE/COLONY COUNT: CPT | Performed by: EMERGENCY MEDICINE

## 2022-11-15 PROCEDURE — 250N000011 HC RX IP 250 OP 636: Performed by: EMERGENCY MEDICINE

## 2022-11-15 PROCEDURE — 36416 COLLJ CAPILLARY BLOOD SPEC: CPT | Performed by: PHYSICIAN ASSISTANT

## 2022-11-15 PROCEDURE — C9803 HOPD COVID-19 SPEC COLLECT: HCPCS | Performed by: EMERGENCY MEDICINE

## 2022-11-15 PROCEDURE — 36415 COLL VENOUS BLD VENIPUNCTURE: CPT | Performed by: EMERGENCY MEDICINE

## 2022-11-15 PROCEDURE — 74176 CT ABD & PELVIS W/O CONTRAST: CPT

## 2022-11-15 PROCEDURE — 74176 CT ABD & PELVIS W/O CONTRAST: CPT | Mod: 26 | Performed by: RADIOLOGY

## 2022-11-15 PROCEDURE — 81001 URINALYSIS AUTO W/SCOPE: CPT | Performed by: EMERGENCY MEDICINE

## 2022-11-15 PROCEDURE — 80048 BASIC METABOLIC PNL TOTAL CA: CPT | Performed by: EMERGENCY MEDICINE

## 2022-11-15 PROCEDURE — 96374 THER/PROPH/DIAG INJ IV PUSH: CPT | Performed by: EMERGENCY MEDICINE

## 2022-11-15 PROCEDURE — 250N000013 HC RX MED GY IP 250 OP 250 PS 637: Performed by: EMERGENCY MEDICINE

## 2022-11-15 PROCEDURE — U0003 INFECTIOUS AGENT DETECTION BY NUCLEIC ACID (DNA OR RNA); SEVERE ACUTE RESPIRATORY SYNDROME CORONAVIRUS 2 (SARS-COV-2) (CORONAVIRUS DISEASE [COVID-19]), AMPLIFIED PROBE TECHNIQUE, MAKING USE OF HIGH THROUGHPUT TECHNOLOGIES AS DESCRIBED BY CMS-2020-01-R: HCPCS | Performed by: EMERGENCY MEDICINE

## 2022-11-15 PROCEDURE — 82565 ASSAY OF CREATININE: CPT | Performed by: PHYSICIAN ASSISTANT

## 2022-11-15 RX ORDER — ONDANSETRON 4 MG/1
4 TABLET, ORALLY DISINTEGRATING ORAL EVERY 8 HOURS PRN
Qty: 10 TABLET | Refills: 0 | Status: SHIPPED | OUTPATIENT
Start: 2022-11-15 | End: 2022-11-18

## 2022-11-15 RX ORDER — IBUPROFEN 600 MG/1
600 TABLET, FILM COATED ORAL EVERY 6 HOURS PRN
Qty: 30 TABLET | Refills: 0 | Status: SHIPPED | OUTPATIENT
Start: 2022-11-15 | End: 2022-11-29

## 2022-11-15 RX ORDER — OXYCODONE HYDROCHLORIDE 5 MG/1
5 TABLET ORAL ONCE
Status: COMPLETED | OUTPATIENT
Start: 2022-11-15 | End: 2022-11-15

## 2022-11-15 RX ORDER — ONDANSETRON 4 MG/1
4 TABLET, ORALLY DISINTEGRATING ORAL EVERY 6 HOURS PRN
Qty: 20 TABLET | Refills: 0 | Status: SHIPPED | OUTPATIENT
Start: 2022-11-15

## 2022-11-15 RX ORDER — NAPROXEN 500 MG/1
500 TABLET ORAL 2 TIMES DAILY PRN
Qty: 30 TABLET | Refills: 0 | Status: SHIPPED | OUTPATIENT
Start: 2022-11-15 | End: 2022-11-29

## 2022-11-15 RX ORDER — CIPROFLOXACIN 500 MG/1
500 TABLET, FILM COATED ORAL 2 TIMES DAILY
Qty: 14 TABLET | Refills: 0 | Status: SHIPPED | OUTPATIENT
Start: 2022-11-15 | End: 2022-11-22

## 2022-11-15 RX ORDER — ACETAMINOPHEN 500 MG
500-1000 TABLET ORAL EVERY 6 HOURS PRN
Qty: 30 TABLET | Refills: 0 | Status: SHIPPED | OUTPATIENT
Start: 2022-11-15

## 2022-11-15 RX ORDER — ACETAMINOPHEN 325 MG/1
650 TABLET ORAL EVERY 6 HOURS PRN
Qty: 60 TABLET | Refills: 0 | Status: SHIPPED | OUTPATIENT
Start: 2022-11-15

## 2022-11-15 RX ORDER — ONDANSETRON 4 MG/1
4 TABLET, ORALLY DISINTEGRATING ORAL ONCE
Status: COMPLETED | OUTPATIENT
Start: 2022-11-15 | End: 2022-11-15

## 2022-11-15 RX ORDER — HYDROCODONE BITARTRATE AND ACETAMINOPHEN 5; 325 MG/1; MG/1
1 TABLET ORAL EVERY 6 HOURS PRN
Qty: 6 TABLET | Refills: 0 | Status: ON HOLD | OUTPATIENT
Start: 2022-11-15 | End: 2022-11-16

## 2022-11-15 RX ORDER — ACETAMINOPHEN 325 MG/1
650 TABLET ORAL ONCE
Status: COMPLETED | OUTPATIENT
Start: 2022-11-15 | End: 2022-11-15

## 2022-11-15 RX ORDER — HYDROMORPHONE HCL IN WATER/PF 6 MG/30 ML
0.2 PATIENT CONTROLLED ANALGESIA SYRINGE INTRAVENOUS ONCE
Status: COMPLETED | OUTPATIENT
Start: 2022-11-15 | End: 2022-11-15

## 2022-11-15 RX ORDER — CIPROFLOXACIN 500 MG/1
500 TABLET, FILM COATED ORAL ONCE
Status: DISCONTINUED | OUTPATIENT
Start: 2022-11-15 | End: 2022-11-15 | Stop reason: HOSPADM

## 2022-11-15 RX ADMIN — ACETAMINOPHEN 650 MG: 325 TABLET, FILM COATED ORAL at 02:13

## 2022-11-15 RX ADMIN — OXYCODONE HYDROCHLORIDE 5 MG: 5 TABLET ORAL at 03:25

## 2022-11-15 RX ADMIN — ONDANSETRON 4 MG: 4 TABLET, ORALLY DISINTEGRATING ORAL at 02:13

## 2022-11-15 RX ADMIN — HYDROMORPHONE HYDROCHLORIDE 0.2 MG: 0.2 INJECTION, SOLUTION INTRAMUSCULAR; INTRAVENOUS; SUBCUTANEOUS at 06:59

## 2022-11-15 RX ADMIN — ONDANSETRON 4 MG: 4 TABLET, ORALLY DISINTEGRATING ORAL at 03:25

## 2022-11-15 ASSESSMENT — ACTIVITIES OF DAILY LIVING (ADL)
ADLS_ACUITY_SCORE: 35

## 2022-11-15 NOTE — ED PROVIDER NOTES
"  History     Chief Complaint   Patient presents with     Post-op Problem     HPI  Cillian \"Dana\" Paige is a 22 year old adult (assigned male at birth, identifies as female), with PMH notable for nephrolithiasis status post right ureter stone lithotripsy and ureter stent placement 1 week ago who presents to the emergency department with concern of urinary retention.  Patient reports having ureter stents removed earlier today.  Patient had post procedure urination around 3 PM.  Patient has not urinated since then other than having a trace amount while in the ED waiting room.  Patient feels strong need to urinate.  Patient has pain in the suprapubic and right flank areas.  No fever.  Patient is nauseous, no vomiting.     Past Medical History  No past medical history on file.  Past Surgical History:   Procedure Laterality Date     CYSTOSCOPY, URETEROSCOPY, COMBINED Right 11/9/2022    Procedure: CYSTOURETEROSCOPY;  Surgeon: Andrew Cruz MD;  Location: [facility]     LASER REVOLIX LITHOTRIPSY URETER(S), INSERT STENT, COMBINED N/A 11/9/2022    Procedure: LITHOTRIPSY, CALCULUS, URETER, USING HOLMIUM LASER, WITH URETERAL STENT INSERTION;  Surgeon: Andrew Cruz MD;  Location: [facility] OR     acetaminophen (TYLENOL) 325 MG tablet  ondansetron (ZOFRAN ODT) 4 MG ODT tab  dimenhyDRINATE (DRAMAMINE) 50 MG tablet  oxybutynin (DITROPAN) 5 MG tablet  SENNA-docusate sodium (SENNA S) 8.6-50 MG tablet  tamsulosin (FLOMAX) 0.4 MG capsule  Ibuprofen      Allergies   Allergen Reactions     Ibuprofen Nausea     Social History       Past medical history and social history were reviewed with the patient. Additional pertinent items: Patient is visiting the [place] on vacation    Review of Systems  A complete review of systems was performed with pertinent positives and negatives noted in the HPI, and all other systems negative.    Physical Exam   BP: (!) 144/76  Pulse: 90  Temp: 98.9  F (37.2  C)  Resp: 15  SpO2: 99 " %    Physical Exam  General: Uncomfortable appearing. Appears stated age.   HENT: MMM, no oropharyngeal lesions  Eyes: PERRL, normal sclerae  Cardio: Regular rate. Regular rhythm. Extremities well perfused  Resp: Normal work of breathing, normal respiratory rate.  Chest/Back: no visual signs of trauma, right CVA tenderness present, no left CVA tenderness  Abdomen: Suprapubic and RLQ tenderness, non-distended, no rebound, no guarding  Neuro: alert and fully oriented. CN II-XII grossly intact. Grossly normal strength and sensation in all extremities.   MSK: no deformities. Grossly normal ROM in extremities.   Integumentary/Skin: no rash visualized, normal color  Psych: normal affect, normal behavior    ED Course      Procedures  Results for orders placed during the hospital encounter of 11/14/22    POC US RETROPERITONEAL LIMITED    Impression  Limited Bedside ED Renal & Bladder Ultrasound:  PERFORMED BY: Dr. Guillermo Steele MD  INDICATIONS:  Flank Pain and Inability to void  PROBE: Low frequency convex probe  BODY LOCATION:  Abdomen (retroperitoneum and bladder)  FINDINGS:  The ultrasound was performed with longitudinal and transverse views of the kidneys and bladder.  Right Kidney: mild hydronephrosis. No visualized cysts.  Left Kidney: No hydronephrosis. No visualized cysts.  Bladder: dimensions consistent with 27 ml urine in the bladder  INTERPRETATION:  Mild right hydronephrosis. Bladder decompressed with 27 ml urine volume.  IMAGE DOCUMENTATION: Images were archived to PACs system.          Labs Ordered and Resulted from Time of ED Arrival to Time of ED Departure   ROUTINE UA WITH MICROSCOPIC REFLEX TO CULTURE - Abnormal       Result Value    Color Urine Light Yellow      Appearance Urine Clear      Glucose Urine Negative      Bilirubin Urine Negative      Ketones Urine 10 (*)     Specific Gravity Urine 1.013      Blood Urine Large (*)     pH Urine 5.0      Protein Albumin Urine 20 (*)     Urobilinogen Urine  Normal      Nitrite Urine Negative      Leukocyte Esterase Urine Moderate (*)     Bacteria Urine Few (*)     Mucus Urine Present (*)     RBC Urine >182 (*)     WBC Urine 65 (*)    BASIC METABOLIC PANEL   URINE CULTURE     Abd/pelvis CT no contrast - Stone Protocol   Final Result   IMPRESSION:    1.  Similar positioning of 2 stones which measure in aggregate 4 mm in the proximal to mid right ureter with associated mild right hydroureteronephrosis. 3 mm left-sided stone now visualized in the proximal left ureter without significant associated    hydroureteronephrosis.. Multiple nonobstructing left calyceal calculi are otherwise unchanged.   2.  Small cystic foci of gas noted in the anti-dependent urinary bladder likely related to recent instrumentation.         POC US RETROPERITONEAL LIMITED   Final Result   Limited Bedside ED Renal & Bladder Ultrasound:   PERFORMED BY: Dr. Guillermo Steele MD   INDICATIONS:  Flank Pain and Inability to void   PROBE: Low frequency convex probe   BODY LOCATION:  Abdomen (retroperitoneum and bladder)   FINDINGS:  The ultrasound was performed with longitudinal and transverse views of the kidneys and bladder.    Right Kidney: mild hydronephrosis. No visualized cysts.     Left Kidney: No hydronephrosis. No visualized cysts.    Bladder: dimensions consistent with 27 ml urine in the bladder    INTERPRETATION:  Mild right hydronephrosis. Bladder decompressed with 27 ml urine volume.    IMAGE DOCUMENTATION: Images were archived to PACs system.                Assessments & Plan (with Medical Decision Making)   Patient presenting with small bouts of urine passing along with significantly increased flank pain following your ureteral stent removal. Vitals in the ED unremarkable. Nursing notes reviewed. Initial differential diagnosis includes but not limited to urinary retention, empty bladder, UTI, nephrolithiasis, procedural complication.     Epic records reviewed patient was seen in the  emergency department on 11/2/2022 and found to have kidney stone, was discharged with plan for outpatient management.  Patient seen again on 11/8/2022 with flank pain having not passed the stone.    Patient was found to have 2 ureteral stones with hydronephrosis.  Patient was admitted to ED observation unit for pain control, urology consult and stent placement.  Recently underwent cystoureteroscopy, lithotripsy,with ureteral stent insertion of the right kidney on 11/9/2022, the procedure was tolerated well.  Patient was discharged with instructions for daily Flomax, oxybutynin 3 times daily with stool softener, Pyridium 3 times a day, Tylenol and ibuprofen for pain control.  Patient underwent cystoscopy today in urology clinic, but the note is not yet complete.    Here, bedside renal/bladder ultrasound notable for decompressed bladder, mild hydronephrosis on the right.  UA with > 182 RBCs, 65 WBCs, moderate leukocyte Estrace - indeterminate for UTI.  CT abdomen/pelvis demonstrated residual 4 mm stone in the right ureter at similar location as previous.    In the ED, the patient's symptoms were managed with naproxen, acetaminophen, ondansetron, with improvement in symptoms upon reassessment.  Patient later had recurrence of pain and oxycodone was given.    Patient signed out to oncoming ED provider with plan to follow-up urology consult, dispo pending urology recommendations.  Prescriptions were prepared for naproxen, acetaminophen, ondansetron, and Norco in case of potential discharge.  Patient reports still having tamsulosin at home.    Final diagnoses:   Ureterolithiasis   Flank pain     New Prescriptions    HYDROCODONE-ACETAMINOPHEN (NORCO) 5-325 MG TABLET    Take 1 tablet by mouth every 6 hours as needed for severe pain    NAPROXEN (NAPROSYN) 500 MG TABLET    Take 1 tablet (500 mg) by mouth 2 times daily as needed for moderate pain (4-6) Take with meals.       --  Guillermo Steele MD   Emergency Medicine   [facility] EMERGENCY DEPARTMENT  11/14/2022     Guillermo Steele MD  11/15/22 8856

## 2022-11-15 NOTE — TELEPHONE ENCOUNTER
Attempted to reach patient via phone to discuss surgery location and time. Phone kept ringing and no voicemail. Writer attempted to see if patient was still located in the ED and called. They stated that the patient has been discharged. Writer attempted to call patient's phone number again. Phone just rang and stated that the call cannot be completed at this time.    Anali Jackson LPN  Urology Clinic Service   [facility]

## 2022-11-15 NOTE — ED PROVIDER NOTES
Patient signed out to me by the overnight attending  Briefly plan was for the patient to go to the OR with urology for bilateral ureteral stone management  Patient was ultimately seen by urology in the mid afternoon, and decision had ultimately been made not to take the patient to the operating room and instead have her be seen in the surgical clinic tomorrow for ureterostomy to avoid placement of nephrostomy tubes.    The patient's urinalysis appears to be infected, and this was discussed with the patient.  Urology recommended initiating ciprofloxacin orally until she is seen tomorrow morning.  Surgery clinic to call to arrange for exact timing.  The patient is in agreement with this plan.  We will also prescribe ibuprofen and Tylenol as needed for pain as well as Zofran as needed for nausea  Given strict instructions to return immediately for any worsening pain, fever, weakness or any other signs of infection as we discussed.    At time of discharge from the emergency department, the patient is completely pain-free and agrees with our plan.    /76   Pulse 79   Temp 98  F (36.7  C) (Oral)   Resp 16   SpO2 97%        Andrew Ricks MD  11/15/22 7793

## 2022-11-15 NOTE — TELEPHONE ENCOUNTER
Attempted to contact patient regarding surgery tomorrow. Patients phone continues to ring.... no VM.  States 'your call cannot be completed at this time...'   Will attempt to contact patient again.     Tatiana Robb on 11/15/2022 at 4:23 PM

## 2022-11-15 NOTE — TELEPHONE ENCOUNTER
Called alternative phone number on file. Talked with Carri patients friend who was with patient in car. Informed Carri that pre-op RN will be calling for arrival time and instructions. Plan is for arrival at 530am to [facility].     Primary number updated on file... COVID-19 test was already completed within 4 days in ED.     Carri is aware that surgeon is going to be Dr. Lomeli not Dr. King and he will meet with patient in pre-op to discuss.   Updated surgeon as they had questions I.e. how many stones are going to be removed?      Tatiana Robb on 11/15/2022 at 4:35 PM

## 2022-11-15 NOTE — TELEPHONE ENCOUNTER
Attempted to contact patient regarding surgery tomorrow. Patients phone continues to ring.... no VM.  States 'your call cannot be completed at this time...'   Will attempt to contact patient again.

## 2022-11-15 NOTE — DISCHARGE INSTRUCTIONS
Please go to the Acute surgery center at [address], 5th floor.   The surgical schedulers will call you to discuss the exact timing and instructions.     Take ibuprofen as needed for pain  Take cipro to treat your urinary tract infection    Return immediately to the ED for any fever, weakness, worsening pain, lightheadedness of any other concerns for worsening      Please take antibiotics as prescribed      Return to the Emergency Department immediately if you have worsening symptoms, or any other urgent or potentially life-threatening concerns.

## 2022-11-15 NOTE — CONSULTS
[facility] Urology Consultation    Regan Gibson MRN# [id number]   Age: 22 year old YOB: 2000     Date of Admission:  11/14/2022    Date of Consult:   11/15/2022           Assessment and Plan:   Assessment:   Regan Gibson is a 22 year old adult with a past medical history of nephrolithiasis and underwent treatment here with semirigid ureteroscopy 11/9/2022 with removal of several stone fragments on the right and had her stent removed 11/14/2022 and presented unable to urinate.   Now making some urine and able to void.     She was re-scanned and found to now have bilateral ureteral stones. Her creatinine is approximately double its baseline, 1.34 vs 0.72.         Plan:   - NPO  - UA/Urine culture   - IVF  - Stat COVID (collected at 0400)   - to OR for bilateral ureteral stenting vs re-treatment of stones pending laser availability     Discussed with staff, Dr. King            Chief Complaint:   Unable to pee/bilateral ureteral stones          History of Present Illness:   Regan Gibson is a 22 year old adult with a past medical history of nephrolithiasis and underwent treatment here with semirigid ureteroscopy 11/9/2022 with removal of several stone fragments on the right and had her stent removed 11/14/2022 and presented unable to urinate.     She was re-scanned and found to now have bilateral ureteral stones and has been unable to void. Her creatinine is approximately double its baseline, 1.34 vs 0.72. She appears non-infectious and does not endorse any infectious symptoms. Her UA was leukocyte esterase positive, nitrite negative. She did not have a pre-operative urine culture but her UA prior to surgery appeared unremarkable for infection.              Past Medical History:   No past medical history on file.          Past Surgical History:     Past Surgical History:   Procedure Laterality Date     CYSTOSCOPY, URETEROSCOPY, COMBINED Right 11/9/2022    Procedure: CYSTOURETEROSCOPY;   Surgeon: Andrew Cruz MD;  Location: [facility] OR     LASER REVOLIX LITHOTRIPSY URETER(S), INSERT STENT, COMBINED N/A 11/9/2022    Procedure: LITHOTRIPSY, CALCULUS, URETER, USING HOLMIUM LASER, WITH URETERAL STENT INSERTION;  Surgeon: Andrew Cruz MD;  Location:  OR             Social History:     Social History     Tobacco Use     Smoking status: Not on file     Smokeless tobacco: Not on file   Substance Use Topics     Alcohol use: Not on file             Family History:   No family history on file.             Allergies:     Allergies   Allergen Reactions     Ibuprofen Nausea             Medications:     No current facility-administered medications for this encounter.     Current Outpatient Medications   Medication Sig     acetaminophen (TYLENOL) 325 MG tablet Take 2 tablets (650 mg) by mouth every 6 hours as needed for mild pain     HYDROcodone-acetaminophen (NORCO) 5-325 MG tablet Take 1 tablet by mouth every 6 hours as needed for severe pain     naproxen (NAPROSYN) 500 MG tablet Take 1 tablet (500 mg) by mouth 2 times daily as needed for moderate pain (4-6) Take with meals.     ondansetron (ZOFRAN ODT) 4 MG ODT tab Take 1 tablet (4 mg) by mouth every 6 hours as needed for nausea     dimenhyDRINATE (DRAMAMINE) 50 MG tablet Take 1 tablet (50 mg) by mouth nightly as needed for sleep     oxybutynin (DITROPAN) 5 MG tablet Take 1 tablet (5 mg) by mouth 3 times daily as needed for bladder spasms     SENNA-docusate sodium (SENNA S) 8.6-50 MG tablet Take 1 tablet by mouth 2 times daily     tamsulosin (FLOMAX) 0.4 MG capsule Take 1 capsule (0.4 mg) by mouth daily for 30 days            Review of Systems:   Positive findings in BOLD, otherwise negative   Constitutional: chills, fatigue, fever, weight loss  Respiratory: cough and shortness of breath  Cardiovascular: chest pain, palpitations, racing heart beat   Gastrointestinal: abdominal pain, constipation, diarrhea, nausea,  vomiting  Genitourinary: Per HPI   Skin: rash  Neuro: weakness, confusion, focal deficits   All other systems reviewed and are negative          Physical Exam:   All vitals have been reviewed  Temp:  [98.9  F (37.2  C)] 98.9  F (37.2  C)  Pulse:  [89-90] 89  Resp:  [15] 15  BP: (123-144)/(65-76) 123/65  SpO2:  [99 %] 99 %  No intake or output data in the 24 hours ending 11/15/22 0430  Physical Exam:  General:Well appearing, in no acute distress   HEENT: Normocephalic and atraumatic.   CV: Regular rate, non-cyanotic in appearance   Pulm: Normal respiratory effort on room air  Neuro: CNII-XII grossly intact          Data:   All laboratory data reviewed    Results:  BMP  Recent Labs   Lab 11/15/22  0237 11/09/22  1009 11/09/22  0838 11/08/22  2353   * 136  --  136   POTASSIUM 3.7 3.8  --  3.9   CHLORIDE 104 108*  --  103   CO2 15* 19*  --  18*   BUN 15.7 11.7  --  13.6   CR 1.38* 0.72  --  1.05   GLC 96 96 99 107*     CBC  Recent Labs   Lab 11/08/22  2353   WBC 10.1   HGB 13.3        LFT  Recent Labs   Lab 11/08/22  2353   AST 27   ALT 30   ALKPHOS 53   BILITOTAL 0.2   ALBUMIN 4.3     Recent Labs   Lab 11/15/22  0237 11/09/22  1009 11/09/22  0838 11/08/22  2353   GLC 96 96 99 107*       Imaging:  CT November 15, 2022:  Bilateral ureteral stones - some other non-obstructing nephrolithiasis on the left      Adrien Wilkes MD

## 2022-11-15 NOTE — PROGRESS NOTES
Urology Update Note:    Per discussion with patient, her flank/abd pain has completely resolved.  Still has some mild dysuria. She is thirsty and would like to eat/drink.  No nausea, emesis.  She has passed some bits of sediment with urinating and the RN has saved these.  Patient wonders if these are stone material?  Dana would like to cancel the procedure for today given that she is feeling so great.  We rechecked her sCr and it seems to be downtrending despite being npo and not on MIVF.     Despite above, we discussed the risks of potentially having remaining bilateral ureteral stones.  Should they become bilaterally obstructing this would render her anuric.  If their is infection in her obstructed system, she could become septic.  For these reasons, we would recommend definitive bilateral ureteroscopy to remove the remaining stone, and we scheduled that procedure for tomorrow 11/16/22 at the [facility] at 2:30pm.     A/P  Bilateral ureteral stones  Left nonobstructing stones    - OK to eat and drink  - Patient should continue straining urine for stone  - Please discharge with small amount of zofran, tylenol, very limited narcotic.  Consider dramamine to help with sleep and nausea (if needed).  - Please continue Cipro 500mg bid in case of UTI (urine culture is pending and patient is having mild dysuria)    - Dana should be NPO after midnight.  We will plan bilateral ureteroscopy with Dr. Lomeli tomorrow to attempt to clear these stones.   - Our schedulers will contact her with additional instructions  - She should return to the ED immediately with fevers, chills, nausea, emesis, uncontrolled pain, etc.     [facility]

## 2022-11-15 NOTE — ED NOTES
The patient was accepted at shift change signout pending input from urology.  Per urology consult note they plan to take her to the OR this morning.  She will be signed out to the oncoming provider pending transfer to the operating room for definitive management.    Dictation Disclaimer: Some of this Note has been completed with voice-recognition dictation software. Although errors are generally corrected real-time, there is the potential for a rare error to be present in the completed chart.       Yoselin Fu MD  11/15/22 0702

## 2022-11-15 NOTE — H&P (VIEW-ONLY)
Urology Update Note:    Per discussion with patient, her flank/abd pain has completely resolved.  Still has some mild dysuria. She is thirsty and would like to eat/drink.  No nausea, emesis.  She has passed some bits of sediment with urinating and the RN has saved these.  Patient wonders if these are stone material?  Dana would like to cancel the procedure for today given that she is feeling so great.  We rechecked her sCr and it seems to be downtrending despite being npo and not on MIVF.     Despite above, we discussed the risks of potentially having remaining bilateral ureteral stones.  Should they become bilaterally obstructing this would render her anuric.  If their is infection in her obstructed system, she could become septic.  For these reasons, we would recommend definitive bilateral ureteroscopy to remove the remaining stone, and we scheduled that procedure for tomorrow 11/16/22 at the [facility] at 2:30pm.     A/P  Bilateral ureteral stones  Left nonobstructing stones    - OK to eat and drink  - Patient should continue straining urine for stone  - Please discharge with small amount of zofran, tylenol, very limited narcotic.  Consider dramamine to help with sleep and nausea (if needed).  - Please continue Cipro 500mg bid in case of UTI (urine culture is pending and patient is having mild dysuria)    - Dana should be NPO after midnight.  We will plan bilateral ureteroscopy with Dr. Lomeli tomorrow to attempt to clear these stones.   - Our schedulers will contact her with additional instructions  - She should return to the ED immediately with fevers, chills, nausea, emesis, uncontrolled pain, etc.     [facility]

## 2022-11-15 NOTE — ED TRIAGE NOTES
Pt ambulatory to triage with c/o post op problem. Pt states she had her kidney stent removed this afternoon at around 1800, and now is unable to void. Pt A&O4, VSS on RA, pain 5/10.      Triage Assessment     Row Name 11/14/22 1742       Triage Assessment (Adult)    Airway WDL WDL       Respiratory WDL    Respiratory WDL WDL       Skin Circulation/Temperature WDL    Skin Circulation/Temperature WDL WDL       Cardiac WDL    Cardiac WDL WDL       Peripheral/Neurovascular WDL    Peripheral Neurovascular WDL WDL       Cognitive/Neuro/Behavioral WDL    Cognitive/Neuro/Behavioral WDL WDL

## 2022-11-15 NOTE — TELEPHONE ENCOUNTER
Attempted to contact patient on  [facility] phone number listed in chart. Busy signal. Unable to reach on [phone]    Tatiana Robb on 11/15/2022 at 4:30 PM

## 2022-11-16 ENCOUNTER — APPOINTMENT (OUTPATIENT)
Dept: GENERAL RADIOLOGY | Facility: CLINIC | Age: 22
End: 2022-11-16
Attending: UROLOGY

## 2022-11-16 ENCOUNTER — HOSPITAL ENCOUNTER (OUTPATIENT)
Facility: CLINIC | Age: 22
Discharge: HOME | End: 2022-11-16
Attending: UROLOGY | Admitting: UROLOGY
Payer: COMMERCIAL

## 2022-11-16 ENCOUNTER — HOSPITAL ENCOUNTER (OUTPATIENT)
Facility: CLINIC | Age: 22
End: 2022-11-16
Attending: UROLOGY | Admitting: UROLOGY
Payer: COMMERCIAL

## 2022-11-16 ENCOUNTER — ANESTHESIA (OUTPATIENT)
Dept: SURGERY | Facility: CLINIC | Age: 22
End: 2022-11-16

## 2022-11-16 VITALS
WEIGHT: 242.29 LBS | SYSTOLIC BLOOD PRESSURE: 119 MMHG | TEMPERATURE: 97.3 F | DIASTOLIC BLOOD PRESSURE: 75 MMHG | BODY MASS INDEX: 33.92 KG/M2 | HEIGHT: 71 IN | HEART RATE: 107 BPM | OXYGEN SATURATION: 100 % | RESPIRATION RATE: 14 BRPM

## 2022-11-16 DIAGNOSIS — N20.0 NEPHROLITHIASIS: ICD-10-CM

## 2022-11-16 DIAGNOSIS — N20.0 KIDNEY STONE: ICD-10-CM

## 2022-11-16 DIAGNOSIS — R69 DIAGNOSIS UNKNOWN: Primary | ICD-10-CM

## 2022-11-16 DIAGNOSIS — N20.0 NEPHROLITHIASIS: Primary | ICD-10-CM

## 2022-11-16 LAB
BACTERIA UR CULT: NO GROWTH
GLUCOSE BLDC GLUCOMTR-MCNC: 100 MG/DL (ref 70–99)

## 2022-11-16 PROCEDURE — 999N000141 HC STATISTIC PRE-PROCEDURE NURSING ASSESSMENT: Performed by: UROLOGY

## 2022-11-16 PROCEDURE — 82365 CALCULUS SPECTROSCOPY: CPT | Performed by: UROLOGY

## 2022-11-16 PROCEDURE — 272N000001 HC OR GENERAL SUPPLY STERILE: Performed by: UROLOGY

## 2022-11-16 PROCEDURE — 250N000011 HC RX IP 250 OP 636: Performed by: NURSE ANESTHETIST, CERTIFIED REGISTERED

## 2022-11-16 PROCEDURE — 250N000013 HC RX MED GY IP 250 OP 250 PS 637: Performed by: ANESTHESIOLOGY

## 2022-11-16 PROCEDURE — 74420 UROGRAPHY RTRGR +-KUB: CPT | Mod: 26 | Performed by: UROLOGY

## 2022-11-16 PROCEDURE — C1758 CATHETER, URETERAL: HCPCS | Performed by: UROLOGY

## 2022-11-16 PROCEDURE — 52332 CYSTOSCOPY AND TREATMENT: CPT | Mod: 50 | Performed by: UROLOGY

## 2022-11-16 PROCEDURE — 258N000003 HC RX IP 258 OP 636: Performed by: UROLOGY

## 2022-11-16 PROCEDURE — 360N000075 HC SURGERY LEVEL 2, PER MIN: Performed by: UROLOGY

## 2022-11-16 PROCEDURE — 710N000012 HC RECOVERY PHASE 2, PER MINUTE: Performed by: UROLOGY

## 2022-11-16 PROCEDURE — 82962 GLUCOSE BLOOD TEST: CPT

## 2022-11-16 PROCEDURE — 258N000003 HC RX IP 258 OP 636: Performed by: ANESTHESIOLOGY

## 2022-11-16 PROCEDURE — 710N000010 HC RECOVERY PHASE 1, LEVEL 2, PER MIN: Performed by: UROLOGY

## 2022-11-16 PROCEDURE — 250N000009 HC RX 250: Performed by: NURSE ANESTHETIST, CERTIFIED REGISTERED

## 2022-11-16 PROCEDURE — 52352 CYSTOURETERO W/STONE REMOVE: CPT | Mod: RT | Performed by: UROLOGY

## 2022-11-16 PROCEDURE — 255N000002 HC RX 255 OP 636: Performed by: UROLOGY

## 2022-11-16 PROCEDURE — C1769 GUIDE WIRE: HCPCS | Performed by: UROLOGY

## 2022-11-16 PROCEDURE — 250N000011 HC RX IP 250 OP 636: Performed by: UROLOGY

## 2022-11-16 PROCEDURE — 999N000180 XR SURGERY CARM FLUORO LESS THAN 5 MIN: Mod: TC

## 2022-11-16 PROCEDURE — C1894 INTRO/SHEATH, NON-LASER: HCPCS | Performed by: UROLOGY

## 2022-11-16 PROCEDURE — 250N000009 HC RX 250: Performed by: UROLOGY

## 2022-11-16 PROCEDURE — 250N000025 HC SEVOFLURANE, PER MIN: Performed by: UROLOGY

## 2022-11-16 PROCEDURE — C2617 STENT, NON-COR, TEM W/O DEL: HCPCS | Performed by: UROLOGY

## 2022-11-16 PROCEDURE — 370N000017 HC ANESTHESIA TECHNICAL FEE, PER MIN: Performed by: UROLOGY

## 2022-11-16 PROCEDURE — 250N000011 HC RX IP 250 OP 636: Performed by: ANESTHESIOLOGY

## 2022-11-16 DEVICE — URETERAL STENT
Type: IMPLANTABLE DEVICE | Site: URETER | Status: FUNCTIONAL
Brand: PERCUFLEX™ PLUS

## 2022-11-16 RX ORDER — ACETAMINOPHEN 325 MG/1
975 TABLET ORAL ONCE
Status: DISCONTINUED | OUTPATIENT
Start: 2022-11-16 | End: 2022-11-16

## 2022-11-16 RX ORDER — OXYBUTYNIN CHLORIDE 5 MG/1
10 TABLET ORAL ONCE
Status: DISCONTINUED | OUTPATIENT
Start: 2022-11-16 | End: 2022-11-16 | Stop reason: HOSPADM

## 2022-11-16 RX ORDER — TAMSULOSIN HYDROCHLORIDE 0.4 MG/1
0.4 CAPSULE ORAL DAILY
Qty: 14 CAPSULE | Refills: 0 | Status: SHIPPED | OUTPATIENT
Start: 2022-11-16

## 2022-11-16 RX ORDER — ONDANSETRON 2 MG/ML
4 INJECTION INTRAMUSCULAR; INTRAVENOUS EVERY 30 MIN PRN
Status: DISCONTINUED | OUTPATIENT
Start: 2022-11-16 | End: 2022-11-16 | Stop reason: HOSPADM

## 2022-11-16 RX ORDER — DEXAMETHASONE SODIUM PHOSPHATE 4 MG/ML
INJECTION, SOLUTION INTRA-ARTICULAR; INTRALESIONAL; INTRAMUSCULAR; INTRAVENOUS; SOFT TISSUE PRN
Status: DISCONTINUED | OUTPATIENT
Start: 2022-11-16 | End: 2022-11-16

## 2022-11-16 RX ORDER — KETOROLAC TROMETHAMINE 30 MG/ML
INJECTION, SOLUTION INTRAMUSCULAR; INTRAVENOUS PRN
Status: DISCONTINUED | OUTPATIENT
Start: 2022-11-16 | End: 2022-11-16

## 2022-11-16 RX ORDER — SODIUM CHLORIDE, SODIUM LACTATE, POTASSIUM CHLORIDE, CALCIUM CHLORIDE 600; 310; 30; 20 MG/100ML; MG/100ML; MG/100ML; MG/100ML
INJECTION, SOLUTION INTRAVENOUS CONTINUOUS
Status: DISCONTINUED | OUTPATIENT
Start: 2022-11-16 | End: 2022-11-16 | Stop reason: HOSPADM

## 2022-11-16 RX ORDER — LIDOCAINE 40 MG/G
CREAM TOPICAL
Status: DISCONTINUED | OUTPATIENT
Start: 2022-11-16 | End: 2022-11-16 | Stop reason: HOSPADM

## 2022-11-16 RX ORDER — HYDROMORPHONE HCL IN WATER/PF 6 MG/30 ML
0.2 PATIENT CONTROLLED ANALGESIA SYRINGE INTRAVENOUS EVERY 5 MIN PRN
Status: DISCONTINUED | OUTPATIENT
Start: 2022-11-16 | End: 2022-11-16 | Stop reason: HOSPADM

## 2022-11-16 RX ORDER — MEPERIDINE HYDROCHLORIDE 25 MG/ML
12.5 INJECTION INTRAMUSCULAR; INTRAVENOUS; SUBCUTANEOUS
Status: DISCONTINUED | OUTPATIENT
Start: 2022-11-16 | End: 2022-11-16 | Stop reason: HOSPADM

## 2022-11-16 RX ORDER — OXYBUTYNIN CHLORIDE 5 MG/1
10 TABLET ORAL 3 TIMES DAILY PRN
Qty: 30 TABLET | Refills: 0 | Status: SHIPPED | OUTPATIENT
Start: 2022-11-16 | End: 2022-11-16

## 2022-11-16 RX ORDER — FENTANYL CITRATE 50 UG/ML
INJECTION, SOLUTION INTRAMUSCULAR; INTRAVENOUS PRN
Status: DISCONTINUED | OUTPATIENT
Start: 2022-11-16 | End: 2022-11-16

## 2022-11-16 RX ORDER — FENTANYL CITRATE 50 UG/ML
50 INJECTION, SOLUTION INTRAMUSCULAR; INTRAVENOUS EVERY 5 MIN PRN
Status: DISCONTINUED | OUTPATIENT
Start: 2022-11-16 | End: 2022-11-16 | Stop reason: HOSPADM

## 2022-11-16 RX ORDER — FENTANYL CITRATE 50 UG/ML
25 INJECTION, SOLUTION INTRAMUSCULAR; INTRAVENOUS EVERY 5 MIN PRN
Status: DISCONTINUED | OUTPATIENT
Start: 2022-11-16 | End: 2022-11-16 | Stop reason: HOSPADM

## 2022-11-16 RX ORDER — ONDANSETRON 2 MG/ML
INJECTION INTRAMUSCULAR; INTRAVENOUS PRN
Status: DISCONTINUED | OUTPATIENT
Start: 2022-11-16 | End: 2022-11-16

## 2022-11-16 RX ORDER — LIDOCAINE HYDROCHLORIDE 20 MG/ML
JELLY TOPICAL PRN
Status: DISCONTINUED | OUTPATIENT
Start: 2022-11-16 | End: 2022-11-16 | Stop reason: HOSPADM

## 2022-11-16 RX ORDER — OXYBUTYNIN CHLORIDE 5 MG/1
10 TABLET ORAL 3 TIMES DAILY PRN
Qty: 30 TABLET | Refills: 0 | Status: SHIPPED | OUTPATIENT
Start: 2022-11-16

## 2022-11-16 RX ORDER — PROPOFOL 10 MG/ML
INJECTION, EMULSION INTRAVENOUS PRN
Status: DISCONTINUED | OUTPATIENT
Start: 2022-11-16 | End: 2022-11-16

## 2022-11-16 RX ORDER — ACETAMINOPHEN 325 MG/1
975 TABLET ORAL ONCE
Status: COMPLETED | OUTPATIENT
Start: 2022-11-16 | End: 2022-11-16

## 2022-11-16 RX ORDER — GLYCOPYRROLATE 0.2 MG/ML
INJECTION, SOLUTION INTRAMUSCULAR; INTRAVENOUS PRN
Status: DISCONTINUED | OUTPATIENT
Start: 2022-11-16 | End: 2022-11-16

## 2022-11-16 RX ORDER — HYDROCODONE BITARTRATE AND ACETAMINOPHEN 5; 325 MG/1; MG/1
1 TABLET ORAL EVERY 6 HOURS PRN
Qty: 6 TABLET | Refills: 0 | Status: SHIPPED | OUTPATIENT
Start: 2022-11-16 | End: 2022-11-16

## 2022-11-16 RX ORDER — METOPROLOL TARTRATE 1 MG/ML
1-2 INJECTION, SOLUTION INTRAVENOUS EVERY 5 MIN PRN
Status: DISCONTINUED | OUTPATIENT
Start: 2022-11-16 | End: 2022-11-16 | Stop reason: HOSPADM

## 2022-11-16 RX ORDER — HYDROMORPHONE HCL IN WATER/PF 6 MG/30 ML
0.4 PATIENT CONTROLLED ANALGESIA SYRINGE INTRAVENOUS EVERY 5 MIN PRN
Status: DISCONTINUED | OUTPATIENT
Start: 2022-11-16 | End: 2022-11-16 | Stop reason: HOSPADM

## 2022-11-16 RX ORDER — TAMSULOSIN HYDROCHLORIDE 0.4 MG/1
0.4 CAPSULE ORAL DAILY
Qty: 14 CAPSULE | Refills: 0 | Status: SHIPPED | OUTPATIENT
Start: 2022-11-16 | End: 2022-11-16

## 2022-11-16 RX ORDER — ONDANSETRON 4 MG/1
4 TABLET, ORALLY DISINTEGRATING ORAL EVERY 30 MIN PRN
Status: DISCONTINUED | OUTPATIENT
Start: 2022-11-16 | End: 2022-11-16 | Stop reason: HOSPADM

## 2022-11-16 RX ORDER — PROPOFOL 10 MG/ML
INJECTION, EMULSION INTRAVENOUS CONTINUOUS PRN
Status: DISCONTINUED | OUTPATIENT
Start: 2022-11-16 | End: 2022-11-16

## 2022-11-16 RX ORDER — HYDROCODONE BITARTRATE AND ACETAMINOPHEN 5; 325 MG/1; MG/1
1 TABLET ORAL EVERY 6 HOURS PRN
Qty: 6 TABLET | Refills: 0 | Status: ON HOLD | OUTPATIENT
Start: 2022-11-16 | End: 2022-12-01

## 2022-11-16 RX ORDER — DEXMEDETOMIDINE HYDROCHLORIDE 4 UG/ML
INJECTION, SOLUTION INTRAVENOUS PRN
Status: DISCONTINUED | OUTPATIENT
Start: 2022-11-16 | End: 2022-11-16

## 2022-11-16 RX ORDER — LIDOCAINE HYDROCHLORIDE 20 MG/ML
INJECTION, SOLUTION INFILTRATION; PERINEURAL PRN
Status: DISCONTINUED | OUTPATIENT
Start: 2022-11-16 | End: 2022-11-16

## 2022-11-16 RX ORDER — FENTANYL CITRATE 50 UG/ML
25 INJECTION, SOLUTION INTRAMUSCULAR; INTRAVENOUS
Status: DISCONTINUED | OUTPATIENT
Start: 2022-11-16 | End: 2022-11-16 | Stop reason: HOSPADM

## 2022-11-16 RX ADMIN — FENTANYL CITRATE 50 MCG: 50 INJECTION, SOLUTION INTRAMUSCULAR; INTRAVENOUS at 07:49

## 2022-11-16 RX ADMIN — SUGAMMADEX 200 MG: 100 INJECTION, SOLUTION INTRAVENOUS at 09:06

## 2022-11-16 RX ADMIN — GENTAMICIN SULFATE 440 MG: 40 INJECTION, SOLUTION INTRAMUSCULAR; INTRAVENOUS at 07:18

## 2022-11-16 RX ADMIN — Medication 10 MG: at 08:48

## 2022-11-16 RX ADMIN — PROPOFOL 200 MG: 10 INJECTION, EMULSION INTRAVENOUS at 07:49

## 2022-11-16 RX ADMIN — GLYCOPYRROLATE 0.2 MG: 0.2 INJECTION, SOLUTION INTRAMUSCULAR; INTRAVENOUS at 07:49

## 2022-11-16 RX ADMIN — SODIUM CHLORIDE, POTASSIUM CHLORIDE, SODIUM LACTATE AND CALCIUM CHLORIDE: 600; 310; 30; 20 INJECTION, SOLUTION INTRAVENOUS at 07:39

## 2022-11-16 RX ADMIN — DEXMEDETOMIDINE 12 MCG: 100 INJECTION, SOLUTION, CONCENTRATE INTRAVENOUS at 09:17

## 2022-11-16 RX ADMIN — HYDROMORPHONE HYDROCHLORIDE 0.5 MG: 1 INJECTION, SOLUTION INTRAMUSCULAR; INTRAVENOUS; SUBCUTANEOUS at 08:27

## 2022-11-16 RX ADMIN — ACETAMINOPHEN 975 MG: 325 TABLET, FILM COATED ORAL at 07:25

## 2022-11-16 RX ADMIN — LIDOCAINE HYDROCHLORIDE 100 MG: 20 INJECTION, SOLUTION INFILTRATION; PERINEURAL at 07:49

## 2022-11-16 RX ADMIN — DEXMEDETOMIDINE 8 MCG: 100 INJECTION, SOLUTION, CONCENTRATE INTRAVENOUS at 08:23

## 2022-11-16 RX ADMIN — FENTANYL CITRATE 25 MCG: 50 INJECTION, SOLUTION INTRAMUSCULAR; INTRAVENOUS at 09:53

## 2022-11-16 RX ADMIN — ONDANSETRON 4 MG: 2 INJECTION INTRAMUSCULAR; INTRAVENOUS at 08:06

## 2022-11-16 RX ADMIN — ONDANSETRON 4 MG: 2 INJECTION INTRAMUSCULAR; INTRAVENOUS at 09:45

## 2022-11-16 RX ADMIN — MIDAZOLAM 2 MG: 1 INJECTION INTRAMUSCULAR; INTRAVENOUS at 07:39

## 2022-11-16 RX ADMIN — PROPOFOL 50 MCG/KG/MIN: 10 INJECTION, EMULSION INTRAVENOUS at 08:01

## 2022-11-16 RX ADMIN — Medication 50 MG: at 07:49

## 2022-11-16 RX ADMIN — DEXAMETHASONE SODIUM PHOSPHATE 8 MG: 4 INJECTION, SOLUTION INTRA-ARTICULAR; INTRALESIONAL; INTRAMUSCULAR; INTRAVENOUS; SOFT TISSUE at 08:06

## 2022-11-16 RX ADMIN — KETOROLAC TROMETHAMINE 30 MG: 30 INJECTION, SOLUTION INTRAMUSCULAR at 08:56

## 2022-11-16 ASSESSMENT — ACTIVITIES OF DAILY LIVING (ADL)
ADLS_ACUITY_SCORE: 35

## 2022-11-16 NOTE — ANESTHESIA POSTPROCEDURE EVALUATION
Patient: Regan Gibson    Procedure: Procedure(s):  Cystoscopy, bilateral retrograde Pyelogram, Left Ureteral Dilation, left ureteroscopy, Right ureteroscopy with Stone Basketing, Bilateral Stent  insertion       Anesthesia Type:  General    Note:  Disposition: Outpatient   Postop Pain Control: Uneventful            Sign Out: Well controlled pain   PONV: No   Neuro/Psych: Uneventful            Sign Out: Acceptable/Baseline neuro status   Airway/Respiratory: Uneventful            Sign Out: Acceptable/Baseline resp. status   CV/Hemodynamics: Uneventful            Sign Out: Acceptable CV status; No obvious hypovolemia; No obvious fluid overload   Other NRE: NONE   DID A NON-ROUTINE EVENT OCCUR? No           Last vitals:  Vitals Value Taken Time   /90 11/16/22 1030   Temp 36.2  C (97.2  F) 11/16/22 0920   Pulse 109 11/16/22 1033   Resp 24 11/16/22 1033   SpO2 99 % 11/16/22 1033   Vitals shown include unvalidated device data.    Electronically Signed By: Kody Mckeon MD  November 16, 2022  11:08 AM

## 2022-11-16 NOTE — DISCHARGE INSTRUCTIONS
" Activity  - Do not strain with bowel movements.  - Do not drive until you can press the brake pedal quickly and fully without pain.   - Do not operate a motor vehicle while taking narcotic pain medications.     Stents  - You have a ureteral stents in place. Stents can cause some discomfort, including some blood in your urine (which is normal), the feeling or urge to urinate frequently, burning with urination and pressure/discomfort in your back while voiding. Stay well hydrated to keep your urine as clear as possible.    Medications  - Flomax - to decrease stent discomfort   - Transition from narcotic pain medications to tylenol (acetaminophen) as you are able.   - Ibuprofen is useful for pan management and can be administered in addition to tylenol  - Wean yourself off all pain medications as you are able.  - Some pain medications contain both tylenol (acetaminophen) and a narcotic (Norco, vicodin, percocet), do not take more than 4,000mg of Tylenol (acetaminophen) from all sources in any 24 hour period.  - Narcotics can make you constipated.  Take over the counter fiber (metamucil or benefiber) and stool softeners (miralax, docusate or senna) while taking narcotic pain medications, but stop if you develop diarrhea.  - No driving or operating machinery while taking narcotic pain medications     Follow-Up:  -  Will need another ureteroscopy in next couple weeks.   - Call your primary care provider to touch base regarding your recent procedure.  - Call or return sooner than your regularly scheduled visit if you develop any of the following: fever (greater than 101.5), uncontrolled pain, uncontrolled nausea or vomiting, as well as increased redness, swelling, or drainage from your wound.     Phone numbers:   - Monday through Friday 8am to 4:30pm: Call [phone] with questions or to schedule or confirm appointment.    - Nights or weekends: call the after hours emergency pager - [phone] and tell the  \"I " "would like to page the Urology Resident on call.\"  - For emergencies, call 911    Same-Day Surgery   Adult Discharge Orders & Instructions     For 24 hours after surgery:  Get plenty of rest.  A responsible adult must stay with you for at least 24 hours after you leave the hospital.   Pain medication can slow your reflexes. Do not drive or use heavy equipment.  If you have weakness or tingling, don't drive or use heavy equipment until this feeling goes away.  Mixing alcohol and pain medication can cause dizziness and slow your breathing. It can even be fatal. Do not drink alcohol while taking pain medication.  Avoid strenuous or risky activities.  Ask for help when climbing stairs.   You may feel lightheaded.  If so, sit for a few minutes before standing.  Have someone help you get up.   If you have nausea (feel sick to your stomach), drink only clear liquids such as apple juice, ginger ale, broth or 7-Up.  Rest may also help.  Be sure to drink enough fluids.  Move to a regular diet as you feel able. Take pain medications with a small amount of solid food, such as toast or crackers, to avoid nausea.   A slight fever is normal. Call the doctor if your fever is over 100 F (37.7 C) (taken under the tongue) or lasts longer than 24 hours.  You may have a dry mouth, muscle aches, trouble sleeping or a sore throat.  These symptoms should go away after 24 hours.  Do not make important or legal decisions.   Pain Management:      1. Take pain medication (if prescribed) for pain as directed by your physician.        2. WARNING: If the pain medication you have been prescribed contains Tylenol  (acetaminophen), DO NOT take additional doses of Tylenol (acetaminophen).     Call your doctor for any of the followin.  Signs of infection (fever, growing tenderness at the surgery site, severe pain, a large amount of drainage or bleeding, foul-smelling drainage, redness, swelling).    2.  It has been over 8 to 10 hours since " surgery and you are still not able to urinate (pee).    3.  Headache for over 24 hours.    4.  Numbness, tingling or weakness the day after surgery (if you had spinal anesthesia).  To contact a doctor, call __Dr. Lomeli, Urology, at [phone] __ or:  '   [phone] and ask for the Resident On Call for:          __Urology___ (answered 24 hours a day)  '   Emergency Department:  [facility]: [phone]  [facility]: [phone]               Rev. 10/2014

## 2022-11-16 NOTE — ANESTHESIA PREPROCEDURE EVALUATION
Anesthesia Pre-Procedure Evaluation    Patient: Regan Gibson   MRN: [id number] : 2000        Procedure : Procedure(s):  Cystoscopy, bilateral retrograde pyelogram, bilateral ureteroscopy with holmium laser lithotripsy and stone basket extraction, bilateral ureteral stent placement          History reviewed. No pertinent past medical history.   Past Surgical History:   Procedure Laterality Date     CYSTOSCOPY, URETEROSCOPY, COMBINED Right 2022    Procedure: CYSTOURETEROSCOPY;  Surgeon: Andrew Cruz MD;  Location: [facility] OR     LASER REVOLIX LITHOTRIPSY URETER(S), INSERT STENT, COMBINED N/A 2022    Procedure: LITHOTRIPSY, CALCULUS, URETER, USING HOLMIUM LASER, WITH URETERAL STENT INSERTION;  Surgeon: Andrew Cruz MD;  Location: [facility] OR      Allergies   Allergen Reactions     Ibuprofen Nausea     Naproxen Nausea      Social History     Tobacco Use     Smoking status: Never     Smokeless tobacco: Never   Substance Use Topics     Alcohol use: Not on file      Wt Readings from Last 1 Encounters:   22 109.9 kg (242 lb 4.6 oz)        Anesthesia Evaluation   Pt has had prior anesthetic. Type: General.    No history of anesthetic complications       ROS/MED HX  ENT/Pulmonary:  - neg pulmonary ROS     Neurologic:  - neg neurologic ROS     Cardiovascular:  - neg cardiovascular ROS     METS/Exercise Tolerance:     Hematologic:  - neg hematologic  ROS     Musculoskeletal:  - neg musculoskeletal ROS     GI/Hepatic:     (+) GERD, Asymptomatic on medication,     Renal/Genitourinary:     (+) renal disease, Pt does not require dialysis, Nephrolithiasis ,     Endo:  - neg endo ROS     Psychiatric/Substance Use:  - neg psychiatric ROS     Infectious Disease:  - neg infectious disease ROS     Malignancy:  - neg malignancy ROS     Other:  - neg other ROS          Physical Exam    Airway  airway exam normal           Respiratory Devices and Support         Dental  no notable dental  history         Cardiovascular   cardiovascular exam normal          Pulmonary   pulmonary exam normal                OUTSIDE LABS:  CBC:   Lab Results   Component Value Date    WBC 10.1 11/08/2022    WBC 11.0 11/03/2022    HGB 13.3 11/08/2022    HGB 12.9 11/03/2022    HCT 39.0 11/08/2022    HCT 39.3 11/03/2022     11/08/2022     11/03/2022     BMP:   Lab Results   Component Value Date     (L) 11/15/2022     11/09/2022    POTASSIUM 3.7 11/15/2022    POTASSIUM 3.8 11/09/2022    CHLORIDE 104 11/15/2022    CHLORIDE 108 (H) 11/09/2022    CO2 15 (L) 11/15/2022    CO2 19 (L) 11/09/2022    BUN 15.7 11/15/2022    BUN 11.7 11/09/2022    CR 1.24 (H) 11/15/2022    CR 1.38 (H) 11/15/2022     (H) 11/16/2022    GLC 96 11/15/2022     COAGS: No results found for: PTT, INR, FIBR  POC: No results found for: BGM, HCG, HCGS  HEPATIC:   Lab Results   Component Value Date    ALBUMIN 4.3 11/08/2022    PROTTOTAL 7.1 11/08/2022    ALT 30 11/08/2022    AST 27 11/08/2022    ALKPHOS 53 11/08/2022    BILITOTAL 0.2 11/08/2022     OTHER:   Lab Results   Component Value Date    LACT 1.5 11/03/2022    JOLIE 9.3 11/15/2022    LIPASE 69 (H) 11/02/2022       Anesthesia Plan    ASA Status:  2   NPO Status:  NPO Appropriate    Anesthesia Type: General.     - Airway: ETT   Induction: Intravenous, Propofol.   Maintenance: Balanced.        Consents    Anesthesia Plan(s) and associated risks, benefits, and realistic alternatives discussed. Questions answered and patient/representative(s) expressed understanding.    - Discussed:     - Discussed with:  Patient      - Extended Intubation/Ventilatory Support Discussed: No.      - Patient is DNR/DNI Status: No    Use of blood products discussed: No .     Postoperative Care    Pain management: IV analgesics, Oral pain medications.   PONV prophylaxis: Ondansetron (or other 5HT-3), Dexamethasone or Solumedrol, Background Propofol Infusion     Comments:                Kody Mckeon MD

## 2022-11-16 NOTE — INTERVAL H&P NOTE
"I have reviewed the surgical (or preoperative) H&P that is linked to this encounter, and examined the patient. There are no significant changes    Clinical Conditions Present on Arrival:  Clinically Significant Risk Factors Present on Admission           # Hyponatremia: Lowest Na = 135 mmol/L (Ref range: 136-145) in last 2 days, will monitor as appropriate          # Obesity: Estimated body mass index is 33.81 kg/m  as calculated from the following:    Height as of this encounter: 1.803 m (5' 10.98\").    Weight as of this encounter: 109.9 kg (242 lb 4.6 oz).       "

## 2022-11-16 NOTE — ANESTHESIA PROCEDURE NOTES
Airway       Patient location during procedure: OR       Procedure Start/Stop Times: 11/16/2022 7:51 AM  Staff -        CRNA: Stefanie Sellers APRN CRNA       Performed By: CRNA  Consent for Airway        Urgency: elective  Indications and Patient Condition       Indications for airway management: benitez-procedural       Induction type:intravenous       Mask difficulty assessment: 1 - vent by mask    Final Airway Details       Final airway type: endotracheal airway       Successful airway: ETT - single  Endotracheal Airway Details        ETT size (mm): 7.5       Cuffed: yes       Successful intubation technique: direct laryngoscopy       DL Blade Type: [place] 2       Grade View of Cords: 1       Adjucts: stylet       Position: Right       Measured from: gums/teeth       Secured at (cm): 22       Bite block used: Oral Airway    Post intubation assessment        Placement verified by: capnometry, equal breath sounds and chest rise        Number of attempts at approach: 1       Number of other approaches attempted: 0       Secured with: pink tape       Ease of procedure: easy       Dentition: Intact    Medication(s) Administered   Medication Administration Time: 11/16/2022 7:51 AM

## 2022-11-16 NOTE — INTERVAL H&P NOTE
"I have reviewed the surgical (or preoperative) H&P that is linked to this encounter, and examined the patient. There are no significant changes.  We discussed the benefits and risks of ureteroscopy, including but not limited to, bleeding, infection, need for stent/stent related symptoms, injury to ureter, need for second procedure if unable to reach stone or residual fragments.       Clinical Conditions Present on Arrival:  Clinically Significant Risk Factors Present on Admission           # Hyponatremia: Lowest Na = 135 mmol/L (Ref range: 136-145) in last 2 days, will monitor as appropriate          # Obesity: Estimated body mass index is 33.81 kg/m  as calculated from the following:    Height as of this encounter: 1.803 m (5' 10.98\").    Weight as of this encounter: 109.9 kg (242 lb 4.6 oz).       "

## 2022-11-16 NOTE — ANESTHESIA CARE TRANSFER NOTE
Patient: Regan Gibson    Procedure: Procedure(s):  Cystoscopy, bilateral retrograde Pyelogram, Left Ureteral Dilation, left ureteroscopy, Right ureteroscopy with Stone Basketing, Bilateral Stent  insertion       Diagnosis: Bilateral kidney stones [N20.0]  Diagnosis Additional Information: No value filed.    Anesthesia Type:   General     Note:    Oropharynx: oropharynx clear of all foreign objects  Level of Consciousness: drowsy  Oxygen Supplementation: face mask  Level of Supplemental Oxygen (L/min / FiO2): 8  Independent Airway: airway patency satisfactory and stable  Dentition: dentition unchanged  Vital Signs Stable: post-procedure vital signs reviewed and stable  Report to RN Given: handoff report given  Patient transferred to: PACU  Comments: Anesthesia Care Transfer Note    Patient: Regan Gibson    Transferred to: PACU with supplemental O2    Patient vital signs: stable    Airway: none    Monitors on, VSS, breathing spontaneously, report to RN      Stefanie Victor CRNA   11/16/2022 9:22 AM  Handoff Report: Identifed the Patient, Identified the Reponsible Provider, Reviewed the pertinent medical history, Discussed the surgical course, Reviewed Intra-OP anesthesia mangement and issues during anesthesia, Set expectations for post-procedure period and Allowed opportunity for questions and acknowledgement of understanding      Vitals:  Vitals Value Taken Time   BP     Temp     Pulse     Resp     SpO2 100 % 11/16/22 0920   Vitals shown include unvalidated device data.    Electronically Signed By: IBRAHIMA Cifuentes CRNA  November 16, 2022  9:22 AM

## 2022-11-16 NOTE — RESULT ENCOUNTER NOTE
"Final urine culture report shows \"NO GROWTH\" and is NEGATIVE.  Recommendations in treatment per [facility] ED Lab result Urine culture protocol.  "

## 2022-11-16 NOTE — BRIEF OP NOTE
[facility]    Brief Operative Note    Pre-operative diagnosis: Bilateral kidney stones [N20.0]  Post-operative diagnosis Same as pre-operative diagnosis    Procedure: Procedure(s):  Cystoscopy, bilateral retrograde Pyelogram, Left Ureteral Dilation, left ureteroscopy, Right ureteroscopy with Stone Basketing, Bilateral Stent  insertion  Surgeon: Surgeon(s) and Role:     * Carlos Lomeli MD - Primary     * Selena Conde MD - Assisting  Anesthesia: General   Estimated Blood Loss: 5 mL from 11/16/2022  7:41 AM to 11/16/2022  9:18 AM      Drains: Bilateral 7x26 JJ stents no string  Specimens:   ID Type Source Tests Collected by Time Destination   A : Right Ureteral Stone Calculus/Stone Ureter, Right STONE ANALYSIS Carlos Lomeli MD 11/16/2022  8:53 AM      Findings:   right ureteral stone basket extracted intact. Left ureter had physiologic narrowing in the proximal ureter and unable to get scope past it to get to kidney or ureteral stone. Therefore 7x26 JJ stent placed and will need second ureteroscopy on the left.  Complications: None.  Implants:   Implant Name Type Inv. Item Serial No.  Lot No. LRB No. Used Action   STENT URETERAL PERCUFLEX PLUS [id number] - [id number] Stent STENT URETERAL PERCUFLEX PLUS [id number]  [employer] [id number] Left 1 Implanted   STENT URETERAL PERCUFLEX PLUS [id number] - [id number] Stent STENT URETERAL PERCUFLEX PLUS [id number]  [employer] [id number] Right 1 Implanted

## 2022-11-16 NOTE — OP NOTE
OPERATIVE REPORT    PREOPERATIVE DIAGNOSIS:  Bilateral kidney stones [N20.0]     POSTOPERATIVE DIAGNOSIS:  Same    PROCEDURES PERFORMED:   1. Cystoscopy  2. Right Ureteroscopy   3. Right Stone Basket Extraction  4. Right  Retrograde Pyelogram with interpretation of imaging  5. Right  Ureteral Stent placement   6. Left Ureteroscopy  7. Left Ureteral Dilation  8. Left Ureteral Stent placement  9. Left Retrograde Pyelogram with interpretation of imaging    STAFF SURGEON: Carlos Lomeli MD was present and participatory for the entire case.   RESIDENT(S): None  FELLOW: Selena Conde MD     ANESTHESIA: General    ESTIMATED BLOOD LOSS: <5 ml    DRAINS/TUBES:   Bilateral  7 [place] x 26cm double-J ureteral stent without String     IV FLUIDS: Please see dictated anesthesia record  COMPLICATIONS: None.   SPECIMEN:   Right ureteral stone for analysis    SIGNIFICANT FINDINGS:   right ureteral stone basket extracted intact and right ureteral stent exchanged.     Left ureter had physiologic narrowing in the proximal ureter and unable to get scope past it to get to kidney or ureteral stone even after ureteral dilation. Therefore 7x26 JJ stent placed and will need second ureteroscopy on the left.    BRIEF OPERATIVE INDICATIONS:  Dana Gibson is a(n) 22 year old adult who underwent right ureteroscopy on 11/9/2022 and then had stent removed on 11/14/2022. She came to ER initially unable to void   After a discussion of all risks, benefits, and alternatives, the patient elected to proceed with definitive stone management with a ureteroscopic approach.    After induction of general anesthesia the patient was repositioned in dorsal lithotomy and prepped and draped in the standard sterile fashion.  A time out was performed confirming the appropriate patient identity and planned procedure.  The patient received culture directed antibiotics and had bilateral sequential compression devices for DVT propylaxis.    Right  Ureteroscopy  We entered the bladder with flexible ureteroscope. The right ureteral orifice was identified and cannulated with a sensor wire with the aid of a. The wire passed without resistance into the upper pole. We drove up the right ureter alongside the wire and identified the stone in the proximal ureter. We used a basket to remove it intact.   Retrograde Pyelogram There is moderate hydronephrosis.   Right 7F 26 cm stent is inserted with good coil in kidney and bladder under fluoroscopic guidance.      Left Ureteroscopy  Flexible ureteroscope is used to place second sensor wire up to left kidney and wire goes to upper pole. Semirigid ureteroscope advanced in distal ureter up to level of mid ureter no stones seen, second wire placed. A 36 cm 11/13 ureteral access sheath was advanced up to the distal ureter. The flexible ureteroscope was advanced but there is area of physiologic narrowing in the proximal ureter unable to get passed. We switch to the viper scope but get same problem. We use ureteral dilators from 6 to 8 to 10 Montenegrin to dilate that portion of the ureter but have difficulty with the 10 Montenegrin dilator. We try once more with the viper scope over the wire and unable to get past the narrowing. Decision is made to place stent. Retrograde pyelogram shows physiologic narrowing with no significant hydronephrosis in kidney. Pullback ureteroscopy was performed and showed no retained stone fragments or significant ureteral injury  Stent insertion  Left 7F 26 cm stent is inserted with good coil in kidney and bladder under fluoroscopic guidance.      The bladder was drained    The patient tolerated the procedure well and there were no apparent complications. The patient  was transported to the postanesthesia care unit in stable condition.     POSTOP PLAN:  return to OR in 1-2 weeks for definitive stone clearance on the left and then stent removal on the right    Carlos HITCHCOCK, was present for the entire  case on 11/16/2022.

## 2022-11-17 ENCOUNTER — TELEPHONE (OUTPATIENT)
Dept: UROLOGY | Facility: CLINIC | Age: 22
End: 2022-11-17

## 2022-11-17 NOTE — TELEPHONE ENCOUNTER
Spoke with: Patient       Date of surgery: Wednesday Nove 23 2022      [facility]: [facility]      Informed patient they will need a adult : YES      Pre op with provider: Recent OR H&P 11-15-22      H&P Scheduled in PAC- NA        Pre procedure covid :Patient knows to do a home covid test 1-2 days prior take photo of NEG test and bring in DOS    Additional imaging: NA        Surgery Packet : Forward to Tatiana JACOBSON    Additional comments:Please call with surgery teaching

## 2022-11-17 NOTE — TELEPHONE ENCOUNTER
Procedure: Cystoureteroscopy with retrograde pyelogram and stent insertion    Date: 11/23/22  [place] [facility] [place] Dr. Ankit Nettles     Post op appt: TBD    H&P: provider   UA/UC: complete  COVID test: 1-2 rapid at home    Medications: reviewed  Soap: reviewed  Reviewed when to start clear liquids and when to start NPO: reviewed  : Friends  24 hour observation: Friends    Pt or family member expressed understanding: yes    Kayla Lowe CMA  11/17/2022  3:47 PM

## 2022-11-17 NOTE — PROGRESS NOTES
Urology Procedure Note     Pre-operative diagnosis: Nephrolithiasis, hematuria   Post-operative diagnosis: Same   Procedure: Cystourethroscopy with removal of indwelling ureteral stent     Surgeon: Danish Romano MD, MS           Indications: Dana Gibson is a 22 year old trans-female without any genital recon with right ureteral stent after right URS. Pain is minimal now. Has hematuria but that is improving. Currently dark pink to light fruit punch in color.     Procedure: Regan Gibson was taken to the procedure room.   She was positioned in lithotomy and genitalia were prepped and draped in the standard fashion.      A flex cystoscope was introduced through the urethra into the urinary bladder.  The urethra was negative.  Within the urinary bladder, there was not evidence of stones, tumors, or growths. Urine was as above. Had to rinse bladder a couple of times to see stent.  The stent was visualized emanating from right UO, grasped with a grasping forceps and removed intact without difficulty.    One tablet of Cipro was given for antibiotic coverage.    F/U: if hematuria worsens or new flank pain or fever then call clinic or go to ER. This is first stone so no other F/U.

## 2022-11-17 NOTE — TELEPHONE ENCOUNTER
----- Message from Karen Akhtar RN sent at 11/17/2022  1:53 PM CST -----  Regarding: URGENT TECHING  Hi  This pt needs at home covid  No urine needed   No pre-op needed    Not staying overnight    Pronouns she/her    Karen   ----- Message -----  From: Karen Akhtar RN  Sent: 11/17/2022  10:16 AM CST  To: Karen Akhtar RN  Subject: dos 11/23 teaching

## 2022-11-19 ENCOUNTER — APPOINTMENT (OUTPATIENT)
Dept: CT IMAGING | Facility: CLINIC | Age: 22
End: 2022-11-19
Attending: EMERGENCY MEDICINE
Payer: COMMERCIAL

## 2022-11-19 ENCOUNTER — HOSPITAL ENCOUNTER (OUTPATIENT)
Facility: CLINIC | Age: 22
Setting detail: OBSERVATION
Discharge: HOME | End: 2022-11-19
Attending: EMERGENCY MEDICINE | Admitting: PHYSICIAN ASSISTANT

## 2022-11-19 VITALS
RESPIRATION RATE: 18 BRPM | WEIGHT: 242.1 LBS | OXYGEN SATURATION: 100 % | TEMPERATURE: 97.5 F | BODY MASS INDEX: 32.79 KG/M2 | HEIGHT: 72 IN | DIASTOLIC BLOOD PRESSURE: 74 MMHG | HEART RATE: 98 BPM | SYSTOLIC BLOOD PRESSURE: 123 MMHG

## 2022-11-19 DIAGNOSIS — Z96.0 URETERAL STENT PRESENT: ICD-10-CM

## 2022-11-19 DIAGNOSIS — Z96.0 PRESENCE OF UROGENITAL IMPLANT: ICD-10-CM

## 2022-11-19 DIAGNOSIS — Z87.442 PERSONAL HISTORY OF URINARY CALCULI: ICD-10-CM

## 2022-11-19 DIAGNOSIS — Z98.890 OTHER SPECIFIED POSTPROCEDURAL STATES: ICD-10-CM

## 2022-11-19 DIAGNOSIS — R10.9 FLANK PAIN: ICD-10-CM

## 2022-11-19 LAB
ALBUMIN SERPL BCG-MCNC: 4.1 G/DL (ref 3.5–5.2)
ALBUMIN UR-MCNC: 100 MG/DL
ALP SERPL-CCNC: 47 U/L (ref 35–129)
ALT SERPL W P-5'-P-CCNC: 13 U/L (ref 10–50)
ANION GAP SERPL CALCULATED.3IONS-SCNC: 12 MMOL/L (ref 7–15)
APPEARANCE STONE: NORMAL
APPEARANCE UR: ABNORMAL
AST SERPL W P-5'-P-CCNC: 14 U/L (ref 10–50)
ATRIAL RATE - MUSE: 70 BPM
BASOPHILS # BLD AUTO: 0 10E3/UL (ref 0–0.2)
BASOPHILS NFR BLD AUTO: 0 %
BILIRUB SERPL-MCNC: 0.3 MG/DL
BILIRUB UR QL STRIP: NEGATIVE
BUN SERPL-MCNC: 15.2 MG/DL (ref 6–20)
CALCIUM SERPL-MCNC: 9.5 MG/DL (ref 8.6–10)
CHLORIDE SERPL-SCNC: 106 MMOL/L (ref 98–107)
COLOR UR AUTO: ABNORMAL
COMPN STONE: NORMAL
CREAT SERPL-MCNC: 0.91 MG/DL (ref 0.51–1.17)
DEPRECATED HCO3 PLAS-SCNC: 21 MMOL/L (ref 22–29)
DIASTOLIC BLOOD PRESSURE - MUSE: NORMAL MMHG
EOSINOPHIL # BLD AUTO: 0.2 10E3/UL (ref 0–0.7)
EOSINOPHIL NFR BLD AUTO: 2 %
ERYTHROCYTE [DISTWIDTH] IN BLOOD BY AUTOMATED COUNT: 11.9 % (ref 10–15)
GFR SERPL CREATININE-BSD FRML MDRD: >90 ML/MIN/1.73M2
GLUCOSE SERPL-MCNC: 107 MG/DL (ref 70–99)
GLUCOSE UR STRIP-MCNC: NEGATIVE MG/DL
HCT VFR BLD AUTO: 37.2 % (ref 35–53)
HGB BLD-MCNC: 12.2 G/DL (ref 11.7–17.7)
HGB UR QL STRIP: ABNORMAL
HOLD SPECIMEN: NORMAL
IMM GRANULOCYTES # BLD: 0.1 10E3/UL
IMM GRANULOCYTES NFR BLD: 1 %
INTERPRETATION ECG - MUSE: NORMAL
KETONES UR STRIP-MCNC: ABNORMAL MG/DL
LACTATE SERPL-SCNC: 1.2 MMOL/L (ref 0.7–2)
LEUKOCYTE ESTERASE UR QL STRIP: ABNORMAL
LYMPHOCYTES # BLD AUTO: 2.3 10E3/UL (ref 0.8–5.3)
LYMPHOCYTES NFR BLD AUTO: 21 %
MCH RBC QN AUTO: 30.7 PG (ref 26.5–33)
MCHC RBC AUTO-ENTMCNC: 32.8 G/DL (ref 31.5–36.5)
MCV RBC AUTO: 94 FL (ref 78–100)
MONOCYTES # BLD AUTO: 0.8 10E3/UL (ref 0–1.3)
MONOCYTES NFR BLD AUTO: 7 %
MUCOUS THREADS #/AREA URNS LPF: PRESENT /LPF
NEUTROPHILS # BLD AUTO: 7.6 10E3/UL (ref 1.6–8.3)
NEUTROPHILS NFR BLD AUTO: 69 %
NITRATE UR QL: NEGATIVE
NRBC # BLD AUTO: 0 10E3/UL
NRBC BLD AUTO-RTO: 0 /100
P AXIS - MUSE: 45 DEGREES
PH UR STRIP: 6 [PH] (ref 5–7)
PLATELET # BLD AUTO: 263 10E3/UL (ref 150–450)
POTASSIUM SERPL-SCNC: 3.7 MMOL/L (ref 3.4–5.3)
PR INTERVAL - MUSE: 124 MS
PROT SERPL-MCNC: 6.9 G/DL (ref 6.4–8.3)
QRS DURATION - MUSE: 86 MS
QT - MUSE: 370 MS
QTC - MUSE: 399 MS
R AXIS - MUSE: 36 DEGREES
RBC # BLD AUTO: 3.98 10E6/UL (ref 3.8–5.9)
RBC URINE: >182 /HPF
SODIUM SERPL-SCNC: 139 MMOL/L (ref 136–145)
SP GR UR STRIP: 1.02 (ref 1–1.03)
SPECIMEN WT: 15 MG
SYSTOLIC BLOOD PRESSURE - MUSE: NORMAL MMHG
T AXIS - MUSE: 45 DEGREES
UROBILINOGEN UR STRIP-MCNC: NORMAL MG/DL
VENTRICULAR RATE- MUSE: 70 BPM
WBC # BLD AUTO: 11 10E3/UL (ref 4–11)
WBC URINE: >182 /HPF

## 2022-11-19 PROCEDURE — 99285 EMERGENCY DEPT VISIT HI MDM: CPT | Mod: 25 | Performed by: EMERGENCY MEDICINE

## 2022-11-19 PROCEDURE — 74176 CT ABD & PELVIS W/O CONTRAST: CPT | Mod: 26 | Performed by: RADIOLOGY

## 2022-11-19 PROCEDURE — 93010 ELECTROCARDIOGRAM REPORT: CPT | Performed by: EMERGENCY MEDICINE

## 2022-11-19 PROCEDURE — 80053 COMPREHEN METABOLIC PANEL: CPT | Performed by: EMERGENCY MEDICINE

## 2022-11-19 PROCEDURE — 74176 CT ABD & PELVIS W/O CONTRAST: CPT

## 2022-11-19 PROCEDURE — G0378 HOSPITAL OBSERVATION PER HR: HCPCS

## 2022-11-19 PROCEDURE — 87086 URINE CULTURE/COLONY COUNT: CPT | Performed by: EMERGENCY MEDICINE

## 2022-11-19 PROCEDURE — 83605 ASSAY OF LACTIC ACID: CPT | Performed by: EMERGENCY MEDICINE

## 2022-11-19 PROCEDURE — 93005 ELECTROCARDIOGRAM TRACING: CPT | Performed by: EMERGENCY MEDICINE

## 2022-11-19 PROCEDURE — 36415 COLL VENOUS BLD VENIPUNCTURE: CPT | Performed by: EMERGENCY MEDICINE

## 2022-11-19 PROCEDURE — 81001 URINALYSIS AUTO W/SCOPE: CPT | Performed by: EMERGENCY MEDICINE

## 2022-11-19 PROCEDURE — 85025 COMPLETE CBC W/AUTO DIFF WBC: CPT | Performed by: EMERGENCY MEDICINE

## 2022-11-19 RX ORDER — KETOROLAC TROMETHAMINE 15 MG/ML
15 INJECTION, SOLUTION INTRAMUSCULAR; INTRAVENOUS ONCE
Status: DISCONTINUED | OUTPATIENT
Start: 2022-11-19 | End: 2022-11-19 | Stop reason: HOSPADM

## 2022-11-19 RX ORDER — MORPHINE SULFATE 4 MG/ML
4 INJECTION, SOLUTION INTRAMUSCULAR; INTRAVENOUS
Status: DISCONTINUED | OUTPATIENT
Start: 2022-11-19 | End: 2022-11-19

## 2022-11-19 RX ORDER — KETOROLAC TROMETHAMINE 15 MG/ML
15 INJECTION, SOLUTION INTRAMUSCULAR; INTRAVENOUS ONCE
Status: DISCONTINUED | OUTPATIENT
Start: 2022-11-19 | End: 2022-11-19

## 2022-11-19 RX ORDER — KETOROLAC TROMETHAMINE 10 MG/1
10 TABLET, FILM COATED ORAL EVERY 6 HOURS PRN
Qty: 20 TABLET | Refills: 0 | Status: SHIPPED | OUTPATIENT
Start: 2022-11-19

## 2022-11-19 ASSESSMENT — ACTIVITIES OF DAILY LIVING (ADL)
ADLS_ACUITY_SCORE: 35

## 2022-11-19 ASSESSMENT — ENCOUNTER SYMPTOMS
ABDOMINAL PAIN: 1
PALPITATIONS: 0
DYSURIA: 1
HEMATURIA: 1
DIFFICULTY URINATING: 0
FLANK PAIN: 1
SHORTNESS OF BREATH: 0

## 2022-11-19 NOTE — ED TRIAGE NOTES
Patient presents to triage with c/o left-sided flank pain. Patient reports kidney stone and stent present in left kidney with surgery scheduled on Wednesday. Patient reports left-sided flank pain and dysuria. Patient reports falling in the bathroom this morning as a result of discomfort, denies hitting head or other injuries.

## 2022-11-19 NOTE — ED PROVIDER NOTES
ED Provider Note  [facility]      History     Chief Complaint   Patient presents with     Flank Pain     The history is provided by the patient and medical records.     Regan Gibson is a 22 year old adult (assigned male at birth, identifies as female) with PMH notable for nephrolithiasis s/p right ureter stone lithotripsy and ureter stent placement on 11/9 w/ removal on 11/14 and is now 3 days s/p cystoscopy, bilateral retrograde pyelogram, left ureteral dilation, left ureteroscopy, right ureteroscopy with stone basketing, and bilateral stent insertion for bilateral ureteral stones and left nonobstructing stones who presents to the ED with left flank pain. This morning patient went to the bathroom and began to urinate then had onset of lightheadedness with extreme left flank pain and subsequently lost consciousness causing her to fall off the toilet and onto her knees. She then woke up on the bathroom floor, she estimates just a few seconds later, was not confused or disoriented. She did not injure herself in the fall. She has never had a syncopal episode d/t pain, but does feel confident that she lost consciousness d/t severity of the left flank pain. No palpitations, chest pain, or shortness of breath. Patient reports having generalized lower abdominal discomfort and bloating and bilateral flank discomfort since her stent placement 3 days ago. She now reports new burning, left flank pain that is much worse than any pain she experienced with prior stent. She also endorses dysuria and hematuria. She states that with previous stent she had dark hematuria with clots, but this is more pink and without clots. She denies fever, urgency, frequency, decreased urine, or difficulty urinating. She notes that she has cystoscopy, ureteroscopy, lithotripsy, and bilateral stent placement scheduled for this Wednesday (11/23).     Past Medical History  No past medical history on file.  Past Surgical  History:   Procedure Laterality Date     CYSTOSCOPY, RETROGRADES, EXTRACT STONE, INSERT STENT, COMBINED Bilateral 11/16/2022    Procedure: Cystoscopy, bilateral retrograde Pyelogram, Left Ureteral Dilation, left ureteroscopy, Right ureteroscopy with Stone Basketing, Bilateral Stent  insertion;  Surgeon: Carlos Lomeli MD;  Location: [facility] OR     CYSTOSCOPY, URETEROSCOPY, COMBINED Right 11/9/2022    Procedure: CYSTOURETEROSCOPY;  Surgeon: Andrew Cruz MD;  Location: [facility] OR     LASER REVOLIX LITHOTRIPSY URETER(S), INSERT STENT, COMBINED N/A 11/9/2022    Procedure: LITHOTRIPSY, CALCULUS, URETER, USING HOLMIUM LASER, WITH URETERAL STENT INSERTION;  Surgeon: Andrew Cruz MD;  Location: [facility] OR     acetaminophen (TYLENOL) 325 MG tablet  acetaminophen (TYLENOL) 500 MG tablet  ciprofloxacin (CIPRO) 500 MG tablet  dimenhyDRINATE (DRAMAMINE) 50 MG tablet  HYDROcodone-acetaminophen (NORCO) 5-325 MG tablet  ibuprofen (ADVIL/MOTRIN) 600 MG tablet  naproxen (NAPROSYN) 500 MG tablet  ondansetron (ZOFRAN ODT) 4 MG ODT tab  oxybutynin (DITROPAN) 5 MG tablet  SENNA-docusate sodium (SENNA S) 8.6-50 MG tablet  tamsulosin (FLOMAX) 0.4 MG capsule      Allergies   Allergen Reactions     Ibuprofen Nausea     Naproxen Nausea     Family History  No family history on file.  Social History   Social History     Tobacco Use     Smoking status: Never     Smokeless tobacco: Never      Past medical history, past surgical history, medications, allergies, family history, and social history were reviewed with the patient. No additional pertinent items.       Review of Systems   Respiratory: Negative for shortness of breath.    Cardiovascular: Negative for chest pain and palpitations.   Gastrointestinal: Positive for abdominal pain (lower abdominal discomfort/bloating).   Genitourinary: Positive for dysuria, flank pain (L>R) and hematuria. Negative for decreased urine volume, difficulty urinating and urgency.  "  Neurological: Positive for syncope.   All other systems reviewed and are negative.    A complete review of systems was performed with pertinent positives and negatives noted in the HPI, and all other systems negative.    Physical Exam   BP: 132/84  Pulse: 92  Temp: 98.1  F (36.7  C)  Resp: 16  Height: 181.6 cm (5' 11.5\")  Weight: 109.8 kg (242 lb 1.6 oz) (standing scale)  SpO2: 99 %  Physical Exam  General: patient is alert and oriented and in no acute distress   Head: atraumatic and normocephalic   EENT: moist mucus membranes, sclera anicteric   Neck: supple   Cardiovascular: regular rate and rhythm, extremities warm and well perfused, no lower extremity edema  Pulmonary: lungs clear to auscultation bilaterally   Abdomen: soft, TTP in the LLQ and LUQ without guarding, no CVA TTP   Musculoskeletal: normal range of motion   Neurological: alert and oriented, moving all extremities symmetrically, gait normal   Skin: warm, dry     ED Course      Procedures            EKG Interpretation:      Interpreted by Charlotte Vargas MD  Time reviewed: 1028  Symptoms at time of EKG: none   Rhythm: normal sinus   Rate: normal  Axis: normal  Ectopy: none  Conduction: normal  ST Segments/ T Waves: No ST-T wave changes  Q Waves: none  Comparison to prior: No old EKG available    Clinical Impression: normal EKG                    No results found for any visits on 11/19/22.  Medications - No data to display     Assessments & Plan (with Medical Decision Making)   Ms. Gibson is a 22 year old adult (assigned male at birth, identifies as female) with PMH notable for nephrolithiasis s/p right ureter stone lithotripsy and ureter stent placement on 11/9 w/ removal on 11/14 and is now 3 days s/p cystoscopy, bilateral retrograde pyelogram, left ureteral dilation, left ureteroscopy, right ureteroscopy with stone basketing, and bilateral stent insertion for bilateral ureteral stones and left nonobstructing stones who presents to the ED with " left flank pain and syncopal episode.  She is hemodynamically stable, afebrile and in no respiratory distress.  Suspected syncopal episode was a vasovagal episode secondary to pain rather than other emergent pathology including dysrhythmia, PE.  ECG shows NSR without high degree AV block, QT prolongation, WPW or signs of Brugada syndrome.  Laboratory evaluation demonstrates no leukocytosis with count of 11.  Creatinine is 0.91.  Abdominal CT demonstrates retained stone, stents in appropriate position with no hydronephrosis.  Discussed with urology and pain is thought to be secondary to stent placement.  UA is most likely secondary to stents and felt to be less likely due to infection.  She did recently have a urine culture which was normal.  No antibiotics recommended at this time.  She is not a candidate for more urgent procedure given that her stents were just recently placed.  Currently she is pain-free.  We did discuss an observation stay versus management at home and she would like to try management at home.  She was instructed to continue home oxybutynin and Flomax and given a prescription for Toradol as she reports she has tolerated this well in the past.  She was given close return precautions for the emergency department and voiced understanding.  She will plan to follow-up with urology as scheduled within the week.    I have reviewed the nursing notes. I have reviewed the findings, diagnosis, plan and need for follow up with the patient.    New Prescriptions    KETOROLAC (TORADOL) 10 MG TABLET    Take 1 tablet (10 mg) by mouth every 6 hours as needed for moderate pain (4-6)       Final diagnoses:   Flank pain   Ureteral stent present   I, Iris Prieto, am serving as a trained medical scribe to document services personally performed by Charlotte Osorio MD, based on the provider's statements to me.     I, Charlotte Osorio MD, was physically present and have reviewed and verified the accuracy of  this note documented by Iris Prieto.    --  Charlotte Vargas MD  [facility] EMERGENCY DEPARTMENT  11/19/2022     Charlotte Vargas MD  11/19/22 4232

## 2022-11-19 NOTE — DISCHARGE INSTRUCTIONS
Please make an appointment to follow up with Urology Clinic (phone: [phone]) as scheduled.  Continue previously prescribed Flomax and oxybutynin.  Use Toradol as needed for pain.    If you have worsening symptoms including increased pain, intractable vomiting, fevers or other concerns return to the emergency department for reevaluation.

## 2022-11-19 NOTE — PROGRESS NOTES
Remote Note    CC: flank pain    HPI: This is a 22-year-old trans-female without any genital recon who is visiting from [place] who is presenting with left-sided flank pain and dysuria.  Reports falling in the bathroom this morning as a result of discomfort.    She initially presented on November 2, 2022 and was diagnosed with a 4 mm right proximal ureteral stone and underwent trial of passage.    She came back to the hospital on November 8, 2022 and had tandem stones in the right proximal ureter 4 mm and 3 mm.  She eventually underwent right ureteroscopy with laser lithotripsy and stone basket extraction and stent placement on November 9, 2022    On November 14, 2022 she had right ureteral stent removed.  That night she presented to the emergency room again because she was unable to urinate.  At the time she had a 4 mm stone in the proximal right ureter but also now at 3 mm stone in the proximal left ureter.    On November 16, 2022 we took her to the OR for bilateral ureteroscopy.  We were able to treat the stone on the right side, however she had physiologic narrowing of the left ureter and it was unable to accommodate the scope and therefore a stent was placed with plan for a second ureteroscopy which is scheduled this coming Wednesday on November 23.  She now has stents on both sides    In the ER temperature is 36.7 her heart rate is 92 and her blood pressure is 132/ 84.  Her blood work shows a normal creatinine at 0.91 WBC count of 11.0 and hemoglobin of 12.2    Her urine analysis is significant for large blood and large leukocyte esterase but nitrite negative.    A CT scan was obtained which shows no stones in the right ureter or kidney and 3 stones in the left kidney 3 mm 1 mm and 3 mm.  Stents are in position without hydronephrosis.    A/P:  This is a 22-year-old trans-female without any genital recon who is visiting from [place] who is presenting with left-sided flank pain and dysuria.  She initially had a  right ureteroscopy and stent placement.  After the stent was removed she presented with bilateral obstructing stones and then underwent bilateral ureteroscopy at which point the right-sided stone was treated but the left-sided stone was not treated due to physiologic narrowing of the ureter and the stent was placed.  She is now presenting with left-sided flank pain    Her UA is positive but this can be expected with recent instrumentation and stents.  Her urine culture was negative from 4 days ago I do not think she has an infection.    Stents are in position.    I suspect her pain and discomfort is from the stents.  She is already taking hydrocodone and tamsulosin and oxybutynin for stent pain.    There is no hydronephrosis on the left side to suggest stent failure as source for her pain    I would recommend additional pain control with ketorolac 15 mg if her pain is better controlled then I think she can be discharged on p.o. ketorolac 10 mg every 6 for about 12 tablets.  Until we can get her in to her surgery next Wednesday.    The above treatment consideration and suggestions are based on discussion with the consulting provider. I have not personally examined the patient. All recommendations should be implemented with consideration of the patients relevant prior history and current clinical status. Please feel free to contact me with any questions about this patient's care    Selena Conde MD  Urology

## 2022-11-20 LAB — BACTERIA UR CULT: NO GROWTH

## 2022-11-22 ENCOUNTER — TELEPHONE (OUTPATIENT)
Dept: UROLOGY | Facility: CLINIC | Age: 22
End: 2022-11-22

## 2022-11-22 ENCOUNTER — HOSPITAL ENCOUNTER (EMERGENCY)
Facility: CLINIC | Age: 22
Discharge: HOME | End: 2022-11-22
Attending: FAMILY MEDICINE | Admitting: FAMILY MEDICINE
Payer: COMMERCIAL

## 2022-11-22 VITALS
SYSTOLIC BLOOD PRESSURE: 123 MMHG | HEART RATE: 86 BPM | DIASTOLIC BLOOD PRESSURE: 72 MMHG | OXYGEN SATURATION: 100 % | RESPIRATION RATE: 16 BRPM | WEIGHT: 241.84 LBS | TEMPERATURE: 97.6 F | BODY MASS INDEX: 32.76 KG/M2 | HEIGHT: 72 IN

## 2022-11-22 DIAGNOSIS — N20.1 URETEROLITHIASIS: ICD-10-CM

## 2022-11-22 DIAGNOSIS — T83.84XA PAIN DUE TO URETERAL STENT, INITIAL ENCOUNTER (H): ICD-10-CM

## 2022-11-22 LAB
ALBUMIN SERPL BCG-MCNC: 4.2 G/DL (ref 3.5–5.2)
ALBUMIN UR-MCNC: 100 MG/DL
ALP SERPL-CCNC: 49 U/L (ref 35–129)
ALT SERPL W P-5'-P-CCNC: 20 U/L (ref 10–50)
ANION GAP SERPL CALCULATED.3IONS-SCNC: 12 MMOL/L (ref 7–15)
APPEARANCE UR: ABNORMAL
AST SERPL W P-5'-P-CCNC: 18 U/L (ref 10–50)
BASOPHILS # BLD AUTO: 0 10E3/UL (ref 0–0.2)
BASOPHILS NFR BLD AUTO: 0 %
BILIRUB SERPL-MCNC: 0.2 MG/DL
BILIRUB UR QL STRIP: NEGATIVE
BUN SERPL-MCNC: 12.4 MG/DL (ref 6–20)
CALCIUM SERPL-MCNC: 9.1 MG/DL (ref 8.6–10)
CHLORIDE SERPL-SCNC: 107 MMOL/L (ref 98–107)
COLOR UR AUTO: ABNORMAL
CREAT BLD-MCNC: 0.7 MG/DL (ref 0.5–1.3)
CREAT SERPL-MCNC: 0.84 MG/DL (ref 0.51–1.17)
DEPRECATED HCO3 PLAS-SCNC: 21 MMOL/L (ref 22–29)
EOSINOPHIL # BLD AUTO: 0.3 10E3/UL (ref 0–0.7)
EOSINOPHIL NFR BLD AUTO: 3 %
ERYTHROCYTE [DISTWIDTH] IN BLOOD BY AUTOMATED COUNT: 11.9 % (ref 10–15)
GFR SERPL CREATININE-BSD FRML MDRD: >60 ML/MIN/1.73M2
GFR SERPL CREATININE-BSD FRML MDRD: >90 ML/MIN/1.73M2
GLUCOSE SERPL-MCNC: 79 MG/DL (ref 70–99)
GLUCOSE UR STRIP-MCNC: NEGATIVE MG/DL
HCT VFR BLD AUTO: 36.9 % (ref 35–53)
HGB BLD-MCNC: 12.2 G/DL (ref 11.7–17.7)
HGB UR QL STRIP: ABNORMAL
HOLD SPECIMEN: NORMAL
IMM GRANULOCYTES # BLD: 0.1 10E3/UL
IMM GRANULOCYTES NFR BLD: 1 %
INR PPP: 1 (ref 0.85–1.15)
KETONES UR STRIP-MCNC: NEGATIVE MG/DL
LEUKOCYTE ESTERASE UR QL STRIP: ABNORMAL
LYMPHOCYTES # BLD AUTO: 2.2 10E3/UL (ref 0.8–5.3)
LYMPHOCYTES NFR BLD AUTO: 23 %
MCH RBC QN AUTO: 30.3 PG (ref 26.5–33)
MCHC RBC AUTO-ENTMCNC: 33.1 G/DL (ref 31.5–36.5)
MCV RBC AUTO: 92 FL (ref 78–100)
MONOCYTES # BLD AUTO: 0.7 10E3/UL (ref 0–1.3)
MONOCYTES NFR BLD AUTO: 7 %
MUCOUS THREADS #/AREA URNS LPF: PRESENT /LPF
NEUTROPHILS # BLD AUTO: 6.2 10E3/UL (ref 1.6–8.3)
NEUTROPHILS NFR BLD AUTO: 66 %
NITRATE UR QL: NEGATIVE
NRBC # BLD AUTO: 0 10E3/UL
NRBC BLD AUTO-RTO: 0 /100
PH UR STRIP: 6 [PH] (ref 5–7)
PLATELET # BLD AUTO: 266 10E3/UL (ref 150–450)
POTASSIUM SERPL-SCNC: 4 MMOL/L (ref 3.4–5.3)
PROT SERPL-MCNC: 7 G/DL (ref 6.4–8.3)
RBC # BLD AUTO: 4.02 10E6/UL (ref 3.8–5.9)
RBC URINE: >182 /HPF
SODIUM SERPL-SCNC: 140 MMOL/L (ref 136–145)
SP GR UR STRIP: 1.02 (ref 1–1.03)
UROBILINOGEN UR STRIP-MCNC: NORMAL MG/DL
WBC # BLD AUTO: 9.5 10E3/UL (ref 4–11)
WBC URINE: 31 /HPF

## 2022-11-22 PROCEDURE — 80053 COMPREHEN METABOLIC PANEL: CPT | Performed by: FAMILY MEDICINE

## 2022-11-22 PROCEDURE — 99214 OFFICE O/P EST MOD 30 MIN: CPT | Performed by: PHYSICIAN ASSISTANT

## 2022-11-22 PROCEDURE — 82040 ASSAY OF SERUM ALBUMIN: CPT | Performed by: FAMILY MEDICINE

## 2022-11-22 PROCEDURE — 85610 PROTHROMBIN TIME: CPT | Performed by: FAMILY MEDICINE

## 2022-11-22 PROCEDURE — 999N000127 HC STATISTIC PERIPHERAL IV START W US GUIDANCE

## 2022-11-22 PROCEDURE — 85025 COMPLETE CBC W/AUTO DIFF WBC: CPT | Performed by: FAMILY MEDICINE

## 2022-11-22 PROCEDURE — 81003 URINALYSIS AUTO W/O SCOPE: CPT | Performed by: FAMILY MEDICINE

## 2022-11-22 PROCEDURE — 99283 EMERGENCY DEPT VISIT LOW MDM: CPT | Performed by: FAMILY MEDICINE

## 2022-11-22 PROCEDURE — 96360 HYDRATION IV INFUSION INIT: CPT | Performed by: FAMILY MEDICINE

## 2022-11-22 PROCEDURE — 36415 COLL VENOUS BLD VENIPUNCTURE: CPT | Performed by: FAMILY MEDICINE

## 2022-11-22 PROCEDURE — 82565 ASSAY OF CREATININE: CPT

## 2022-11-22 PROCEDURE — 99284 EMERGENCY DEPT VISIT MOD MDM: CPT | Performed by: FAMILY MEDICINE

## 2022-11-22 PROCEDURE — 96361 HYDRATE IV INFUSION ADD-ON: CPT

## 2022-11-22 PROCEDURE — 258N000003 HC RX IP 258 OP 636: Performed by: FAMILY MEDICINE

## 2022-11-22 PROCEDURE — 87086 URINE CULTURE/COLONY COUNT: CPT | Performed by: FAMILY MEDICINE

## 2022-11-22 PROCEDURE — 999N000285 HC STATISTIC VASC ACCESS LAB DRAW WITH PIV START

## 2022-11-22 RX ORDER — KETOROLAC TROMETHAMINE 10 MG/1
10 TABLET, FILM COATED ORAL EVERY 6 HOURS PRN
Qty: 10 TABLET | Refills: 0 | Status: SHIPPED | OUTPATIENT
Start: 2022-11-22

## 2022-11-22 RX ORDER — LIDOCAINE 40 MG/G
CREAM TOPICAL
Status: DISCONTINUED | OUTPATIENT
Start: 2022-11-22 | End: 2022-11-22 | Stop reason: HOSPADM

## 2022-11-22 RX ADMIN — SODIUM CHLORIDE 1000 ML: 9 INJECTION, SOLUTION INTRAVENOUS at 14:39

## 2022-11-22 ASSESSMENT — ENCOUNTER SYMPTOMS
HEADACHES: 0
DECREASED CONCENTRATION: 0
FLANK PAIN: 1
EYE REDNESS: 0
NECK STIFFNESS: 0
ABDOMINAL PAIN: 0
CONFUSION: 0
ARTHRALGIAS: 0
APPETITE CHANGE: 1
SHORTNESS OF BREATH: 0
CHILLS: 0
SORE THROAT: 0
BLOOD IN STOOL: 0
COUGH: 0
CONSTIPATION: 0
HEMATURIA: 1
ACTIVITY CHANGE: 0
FATIGUE: 0
TROUBLE SWALLOWING: 0
FEVER: 0
DIFFICULTY URINATING: 0
COLOR CHANGE: 0
DYSPHORIC MOOD: 0
WEAKNESS: 0
DIARRHEA: 0
BRUISES/BLEEDS EASILY: 0
NAUSEA: 1

## 2022-11-22 ASSESSMENT — ACTIVITIES OF DAILY LIVING (ADL)
ADLS_ACUITY_SCORE: 35
ADLS_ACUITY_SCORE: 33

## 2022-11-22 NOTE — ED PROVIDER NOTES
[place] EMERGENCY DEPARTMENT ([facility])  11/22/22  History     Chief Complaint   Patient presents with     Social Work Services     Kidney Stone(s) Followup     The history is provided by the patient and medical records.     Regan Gibson is a 22 year old adult with a history notable for nephrolithiasis s/p right ureter stone lithotripsy and ureter stent placement (11/9/2022) with removal on (11/14/2022), and recent cystoscopy, bilateral retrograde pyelogram, left ureteral dilation, left ureteroscopy, right uteroscopy with stone basketing, and bilateral stent insertion for bilateral ureteral stones and left nonobstructing stones who presents to the ED for evaluation of ongoing pain issues along with concerns of patient's surgery.  Patient was noted to have surgery planned for tomorrow was all set to go and then the billing office from the hospital stated that it was a nonemergent procedure and canceled it.  Patient now presents here looking for help stopping ongoing pain he did not take pain medication today because planning for surgery tomorrow.  No fevers or chills.  Still having some blood in the urine.  Patient notes nausea but no vomiting.    I have reviewed the Medications, Allergies, Past Medical and Surgical History, and Social History in the [employer] system.  PAST MEDICAL HISTORY: History reviewed. No pertinent past medical history.    PAST SURGICAL HISTORY:   Past Surgical History:   Procedure Laterality Date     CYSTOSCOPY, RETROGRADES, EXTRACT STONE, INSERT STENT, COMBINED Bilateral 11/16/2022    Procedure: Cystoscopy, bilateral retrograde Pyelogram, Left Ureteral Dilation, left ureteroscopy, Right ureteroscopy with Stone Basketing, Bilateral Stent  insertion;  Surgeon: Carlos Lomeli MD;  Location: [facility]     CYSTOSCOPY, URETEROSCOPY, COMBINED Right 11/9/2022    Procedure: CYSTOURETEROSCOPY;  Surgeon: Andrew Cruz MD;  Location: [facility] OR     LASER REVOLIX LITHOTRIPSY  URETER(S), INSERT STENT, COMBINED N/A 11/9/2022    Procedure: LITHOTRIPSY, CALCULUS, URETER, USING HOLMIUM LASER, WITH URETERAL STENT INSERTION;  Surgeon: Andrew Cruz MD;  Location: [facility] OR       Past medical history, past surgical history, medications, and allergies were reviewed with the patient. Additional pertinent items: None    FAMILY HISTORY: History reviewed. No pertinent family history.    SOCIAL HISTORY:   Social History     Tobacco Use     Smoking status: Never     Smokeless tobacco: Never   Substance Use Topics     Alcohol use: Never     Social history was reviewed with the patient. Additional pertinent items: None      Discharge Medication List as of 11/22/2022  4:43 PM      START taking these medications    Details   !! ketorolac (TORADOL) 10 MG tablet Take 1 tablet (10 mg) by mouth every 6 hours as needed for moderate pain (4-6), Disp-10 tablet, R-0, Local Print       !! - Potential duplicate medications found. Please discuss with provider.      CONTINUE these medications which have NOT CHANGED    Details   !! ketorolac (TORADOL) 10 MG tablet Take 1 tablet (10 mg) by mouth every 6 hours as needed for moderate pain (4-6), Disp-20 tablet, R-0, E-Prescribe      ondansetron (ZOFRAN ODT) 4 MG ODT tab Take 1 tablet (4 mg) by mouth every 6 hours as needed for nausea, Disp-20 tablet, R-0, Local Print      oxybutynin (DITROPAN) 5 MG tablet Take 2 tablets (10 mg) by mouth 3 times daily as needed for bladder spasms, Disp-30 tablet, R-0, Local Print      !! acetaminophen (TYLENOL) 325 MG tablet Take 2 tablets (650 mg) by mouth every 6 hours as needed for mild pain, Disp-60 tablet, R-0, Local Print      !! acetaminophen (TYLENOL) 500 MG tablet Take 1-2 tablets (500-1,000 mg) by mouth every 6 hours as needed for mild pain or fever, Disp-30 tablet, R-0, Local Print      ciprofloxacin (CIPRO) 500 MG tablet Take 1 tablet (500 mg) by mouth 2 times daily for 7 days, Disp-14 tablet, R-0, Local Print       dimenhyDRINATE (DRAMAMINE) 50 MG tablet Take 1 tablet (50 mg) by mouth nightly as needed for sleep, Disp-7 tablet, R-0, Local Print      HYDROcodone-acetaminophen (NORCO) 5-325 MG tablet Take 1 tablet by mouth every 6 hours as needed for breakthrough pain, Disp-6 tablet, R-0, Local Print      ibuprofen (ADVIL/MOTRIN) 600 MG tablet Take 1 tablet (600 mg) by mouth every 6 hours as needed for moderate pain (4-6), Disp-30 tablet, R-0, Local Print      naproxen (NAPROSYN) 500 MG tablet Take 1 tablet (500 mg) by mouth 2 times daily as needed for moderate pain (4-6) Take with meals., Disp-30 tablet, R-0, Local Print      SENNA-docusate sodium (SENNA S) 8.6-50 MG tablet Take 1 tablet by mouth 2 times daily, Disp-30 tablet, R-0, Local Print      tamsulosin (FLOMAX) 0.4 MG capsule Take 1 capsule (0.4 mg) by mouth daily, Disp-14 capsule, R-0, Local Print       !! - Potential duplicate medications found. Please discuss with provider.             Allergies   Allergen Reactions     Ibuprofen Nausea     Naproxen Nausea        Review of Systems   Constitutional: Positive for appetite change. Negative for activity change, chills, fatigue and fever.   HENT: Negative for congestion, sore throat and trouble swallowing.    Eyes: Negative for redness and visual disturbance.   Respiratory: Negative for cough and shortness of breath.    Cardiovascular: Negative for chest pain.   Gastrointestinal: Positive for nausea. Negative for abdominal pain, blood in stool, constipation and diarrhea.   Genitourinary: Positive for flank pain and hematuria. Negative for difficulty urinating.   Musculoskeletal: Negative for arthralgias and neck stiffness.   Skin: Negative for color change and rash.   Allergic/Immunologic: Negative for immunocompromised state.   Neurological: Negative for syncope, weakness and headaches.   Hematological: Does not bruise/bleed easily.   Psychiatric/Behavioral: Negative for confusion, decreased concentration and dysphoric  "mood.   All other systems reviewed and are negative.    A complete review of systems was performed with pertinent positives and negatives noted in the HPI, and all other systems negative.    Physical Exam   BP: 123/71  Pulse: 88  Temp: 97.6  F (36.4  C)  Resp: 16  Height: 181.6 cm (5' 11.5\")  Weight: 109.7 kg (241 lb 13.5 oz)  SpO2: 98 %      Physical Exam  Vitals and nursing note reviewed.   Constitutional:       General: She is in acute distress.      Appearance: She is well-developed. She is not toxic-appearing or diaphoretic.   HENT:      Head: Normocephalic and atraumatic.      Nose: Nose normal.      Mouth/Throat:      Mouth: Mucous membranes are moist.      Pharynx: Oropharynx is clear.   Eyes:      General: No scleral icterus.     Extraocular Movements: Extraocular movements intact.      Conjunctiva/sclera: Conjunctivae normal.      Pupils: Pupils are equal, round, and reactive to light.   Cardiovascular:      Rate and Rhythm: Normal rate and regular rhythm.   Pulmonary:      Effort: No respiratory distress.      Breath sounds: No stridor.   Abdominal:      General: There is no distension.      Tenderness: There is abdominal tenderness (left sided). There is left CVA tenderness.   Musculoskeletal:         General: No swelling.      Cervical back: Normal range of motion and neck supple. No rigidity.   Skin:     General: Skin is warm and dry.      Capillary Refill: Capillary refill takes less than 2 seconds.      Coloration: Skin is not jaundiced.      Findings: No rash.   Neurological:      General: No focal deficit present.      Mental Status: She is alert and oriented to person, place, and time. Mental status is at baseline.   Psychiatric:      Comments: appropriate         ED Course       Patient appears nontoxic discussed with urology recommended labs patient given IV fluids also here in the ER.  Urology did see the patient  did see the patient also this point to try to figure out as patient " is from [place] coverage of the insurance.  Initially they felt patient possibly had to have the rest of stent removal and stone procedure done back in [place] but after further review is noted that patient's insurance is able to cover procedures here etc.  This point though patient's urinalysis does show some white cells etc. but other labs are stable urology follow-up because of the stents this is more likely the cause no fever white count normal felt no indication for antibiotics.  Patient this point did receive some more Toradol prescription 10 mg if needed for pain control does have Zofran at home.  Will contact urology clinic to arrange her next possible surgery and will return if worsening symptoms at all.  Comfortable discharge patient then able to go.    As noted labs reviewed sodium 140 potassium 4.0.  Bicarb is 21 gap is 12 creatinine 0.84.  Glucose 79.  White count 9.5 hemoglobin is 12.2.  INR is 1.           Procedures                Results for orders placed or performed during the hospital encounter of 11/22/22 (from the past 24 hour(s))   UA with Microscopic reflex to Culture    Specimen: Urine, Midstream   Result Value Ref Range    Color Urine Orange (A) Colorless, Straw, Light Yellow, Yellow    Appearance Urine Slightly Cloudy (A) Clear    Glucose Urine Negative Negative mg/dL    Bilirubin Urine Negative Negative    Ketones Urine Negative Negative mg/dL    Specific Gravity Urine 1.019 1.003 - 1.035    Blood Urine Large (A) Negative    pH Urine 6.0 5.0 - 7.0    Protein Albumin Urine 100 (A) Negative mg/dL    Urobilinogen Urine Normal Normal, 2.0 mg/dL    Nitrite Urine Negative Negative    Leukocyte Esterase Urine Large (A) Negative    Mucus Urine Present (A) None Seen /LPF    RBC Urine >182 (H) <=2 /HPF    WBC Urine 31 (H) <=5 /HPF    Narrative    Urine Culture ordered based on laboratory criteria   CBC with platelets differential    Narrative    The following orders were created for panel order CBC  with platelets differential.  Procedure                               Abnormality         Status                     ---------                               -----------         ------                     CBC with platelets and d...[[id number]]                      Final result                 Please view results for these tests on the individual orders.   INR   Result Value Ref Range    INR 1.00 0.85 - 1.15   Comprehensive metabolic panel   Result Value Ref Range    Sodium 140 136 - 145 mmol/L    Potassium 4.0 3.4 - 5.3 mmol/L    Chloride 107 98 - 107 mmol/L    Carbon Dioxide (CO2) 21 (L) 22 - 29 mmol/L    Anion Gap 12 7 - 15 mmol/L    Urea Nitrogen 12.4 6.0 - 20.0 mg/dL    Creatinine 0.84 0.51 - 1.17 mg/dL    Calcium 9.1 8.6 - 10.0 mg/dL    Glucose 79 70 - 99 mg/dL    Alkaline Phosphatase 49 35 - 129 U/L    AST 18 10 - 50 U/L    ALT 20 10 - 50 U/L    Protein Total 7.0 6.4 - 8.3 g/dL    Albumin 4.2 3.5 - 5.2 g/dL    Bilirubin Total 0.2 <=1.2 mg/dL    GFR Estimate >90 >60 mL/min/1.73m2    Narrative    The sex of this patient cannot be reliably determined based on discrepancies in demographics (legal sex, sex assigned at birth, gender identity).  Both male and female reference ranges are provided where applicable.  Careful evaluation of the patient s results as compared to the gender specific reference intervals is required in this setting.    CBC with platelets and differential   Result Value Ref Range    WBC Count 9.5 4.0 - 11.0 10e3/uL    RBC Count 4.02 3.80 - 5.90 10e6/uL    Hemoglobin 12.2 11.7 - 17.7 g/dL    Hematocrit 36.9 35.0 - 53.0 %    MCV 92 78 - 100 fL    MCH 30.3 26.5 - 33.0 pg    MCHC 33.1 31.5 - 36.5 g/dL    RDW 11.9 10.0 - 15.0 %    Platelet Count 266 150 - 450 10e3/uL    % Neutrophils 66 %    % Lymphocytes 23 %    % Monocytes 7 %    % Eosinophils 3 %    % Basophils 0 %    % Immature Granulocytes 1 %    NRBCs per 100 WBC 0 <1 /100    Absolute Neutrophils 6.2 1.6 - 8.3 10e3/uL    Absolute Lymphocytes  2.2 0.8 - 5.3 10e3/uL    Absolute Monocytes 0.7 0.0 - 1.3 10e3/uL    Absolute Eosinophils 0.3 0.0 - 0.7 10e3/uL    Absolute Basophils 0.0 0.0 - 0.2 10e3/uL    Absolute Immature Granulocytes 0.1 <=0.4 10e3/uL    Absolute NRBCs 0.0 10e3/uL    Narrative    The sex of this patient cannot be reliably determined based on discrepancies in demographics (legal sex, sex assigned at birth, gender identity).  Both male and female reference ranges are provided where applicable.  Careful evaluation of the patient s results as compared to the gender specific reference intervals is required in this setting.    Extra Tube    Narrative    The following orders were created for panel order Extra Tube.  Procedure                               Abnormality         Status                     ---------                               -----------         ------                     Extra Red Top Tube[[id number]]                                                          Extra Purple Top Tube[[id number]]                            Final result                 Please view results for these tests on the individual orders.   Extra Purple Top Tube   Result Value Ref Range    Hold Specimen JIC    Extra Tube    Narrative    The following orders were created for panel order Extra Tube.  Procedure                               Abnormality         Status                     ---------                               -----------         ------                     Extra Red Top Tube[[id number]]                               Final result                 Please view results for these tests on the individual orders.   Extra Red Top Tube   Result Value Ref Range    Hold Specimen JIC    Extra Tube ([place] Draw)    Narrative    The following orders were created for panel order Extra Tube ([place] Draw).  Procedure                               Abnormality         Status                     ---------                               -----------         ------                      Extra Purple Top Tube[[id number]]                            Final result                 Please view results for these tests on the individual orders.   Extra Purple Top Tube   Result Value Ref Range    Hold Specimen JIC    Creatinine POCT   Result Value Ref Range    Creatinine POCT 0.7 0.5 - 1.3 mg/dL    GFR, ESTIMATED POCT >60 >60 mL/min/1.73m2    Narrative    The sex of this patient cannot be reliably determined based on discrepancies in demographics (legal sex, sex assigned at birth, gender identity).  Both male and female reference ranges are provided where applicable.  Careful evaluation of the patient s results as compared to the gender specific reference intervals is required in this setting.      Medications   0.9% sodium chloride BOLUS (0 mLs Intravenous Stopped 11/22/22 1643)             Assessments & Plan (with Medical Decision Making)  22-year-old patient presents the ER with concerns of having urological surgery canceled as it was deemed nonemergent and may not have coverage.  Patient in the ER evaluated by urology also  involved.  Further then finally realized that patient does have insurance coverage but procedure surgery was canceled for tomorrow prior.  Patient received IV fluids urinalysis does show some white cells but does have bilateral stents urology felt no indication for antibiotics without fever normal white count etc. renal functions fine otherwise patient discharged contact urology clinic planning for outpatient procedure for stent removal and lithotripsy laser of left ureteral stone and return if fever chills worsening symptoms at all.       I have reviewed the nursing notes.    I have reviewed the findings, diagnosis, plan and need for follow up with the patient.    Discharge Medication List as of 11/22/2022  4:43 PM      START taking these medications    Details   !! ketorolac (TORADOL) 10 MG tablet Take 1 tablet (10 mg) by mouth every 6 hours as needed for moderate  pain (4-6), Disp-10 tablet, R-0, Local Print       !! - Potential duplicate medications found. Please discuss with provider.          Final diagnoses:   Ureterolithiasis   Pain due to ureteral stent, initial encounter (H)         11/22/2022   [facility] EMERGENCY DEPARTMENT    This note was created at least in part by the use of dragon voice dictation system. Inadvertent typographical errors may still exist.  Chaz Chiu MD.    Patient evaluated in the emergency department during the COVID-19 pandemic period. Careful attention to patients safety was addressed throughout the evaluation. Evaluation and treatment management was initiated with disposition made efficiently and appropriate as possible to minimize any risk of potential exposure to patient during this evaluation.       Chaz Chiu MD  11/22/22 2927

## 2022-11-22 NOTE — PROGRESS NOTES
"Care Management Discharge Note    Discharge Date:         Discharge Disposition:  Home    Discharge Services:      Discharge DME:      Discharge Transportation:      Private pay costs discussed: Encouraged pt to speak with FC and insurance company    PAS Confirmation Code:    Patient/family educated on Medicare website which has current facility and service quality ratings:      Education Provided on the Discharge Plan:    Persons Notified of Discharge Plans: Pt; FC; PA  Patient/Family in Agreement with the Plan:      Handoff Referral Completed: No    Additional Information:  1150 - Writer received drive-by consult from Triage RN. Pt is presenting to ED after being told that her international insurance will not cover the procedure. Writer asked to provide assistance with pt to allow pt to have the procedure. Writer informed triage RN to have ED provider (unassigned at the time) connect with urology to see if there was an insurance authorization completed or to determine if they can complete one. There are no account notes in place, thus writer is unable to determine why the procedure was cancelled a this time.    1410 - Writer spoke with Urology Óscar. Pt's surgery was cancelled because it was not deemed to be an \"emergency\" and can be rescheduled once pt's insurance issues are worked out.    1420 - Writer spoke with Aditya, Financial counselor. Per Aditya, pt's insurance does not provide any insurance coverage for surgeries, thus all surgeries for a pt who does not have insurance coverage would need to be paid up front in full. Aditya reports they are able to work with pt RE: his situation, but that pt would need to call him. Aditya also recommended pt speak with his insurance directly.    1450 - Writer updated PA. PA and writer to meet with pt directly and discuss plan moving forward.      1500 - Writer and PA met with pt. Discussed the barriers to providing the procedure today. PA provided pt with the number to " contact to reschedule the procedure. Writer verified pt's insurance card is correctly listed in EHR. Writer encouraged pt to work with  Aditya and to reach out.     1530 - Pt spoke with her insurance and gave them writer's email address. Per insurance, pt has coverage for emergency procedures. Writer forwarded email from insurance to [facility].    Pt was discharged home. Pt will f/u with [facility] and insurance in community to ensure coverage for procedure.  ________________    KEISHA Sanchez, [facility]  ED/Observation   [facility]  Phone: [phone]  Pager: [phone]  Fax: [phone]    On-call pager, [phone], 4:00pm to midnight

## 2022-11-22 NOTE — ED TRIAGE NOTES
"Pt comes in because his surgery \"Cystoscopy, Ureteroscopy, Holmium Laser Lithotripsy, Stent Placement, Bilateral\" scheduled for tomorrow is cancelled and classified as non-urgent because his international insurance will not cover it. Pt evaluated in ED 11/19 for this issue, still has pain when not on Toradol, bloody urine and nausea.     Triage Assessment     Row Name 11/22/22 1128       Triage Assessment (Adult)    Airway WDL WDL       Respiratory WDL    Respiratory WDL WDL       Skin Circulation/Temperature WDL    Skin Circulation/Temperature WDL WDL       Cardiac WDL    Cardiac WDL WDL       Peripheral/Neurovascular WDL    Peripheral Neurovascular WDL WDL       Cognitive/Neuro/Behavioral WDL    Cognitive/Neuro/Behavioral WDL WDL              "

## 2022-11-22 NOTE — CONSULTS
Urology Consult History and Physical    Name: Regan Gibson    MRN: [id number]   YOB: 2000       We were asked to see Regan Gibson at the request of Dr. Chiu for evaluation and treatment of the following chief complaint:          Chief Complaint:   Needing ureteral stone treatment  History is obtained from patient and review of records          History of Present Illness:      Regan Gibson is a 22 year old adult with a past medical history of nephrolithiasis and underwent treatment here with semirigid ureteroscopy 11/9/2022 with removal of several stone fragments on the right and had her stent removed 11/14/2022 and presented to the ED on 11/15/22 with decreased urine production and was found to have BL ureteral stones with CHAMP.  On 11/16/22 her right stone was removed ureteroscopically by Dr. Lomeli who was unable to complete left URS due to narrowed ureter.  Thus a stent was left bilaterally.  Tomorrow the patient was scheduled at the [facility] for left URS with Dr. Nettles and right stent removal, but  this was canceled due to insurance issues per the patient.     Today the patient presents to the ED seeking care.  He is having chronic left flank pain requiring pain medications, chronic hematuria.  Also has been feeling somewhat dizzy and nauseated although no emesis and is tolerating his diet. No fevers, chills.           Past Medical History:   History reviewed. No pertinent past medical history.         Past Surgical History:     Past Surgical History:   Procedure Laterality Date     CYSTOSCOPY, RETROGRADES, EXTRACT STONE, INSERT STENT, COMBINED Bilateral 11/16/2022    Procedure: Cystoscopy, bilateral retrograde Pyelogram, Left Ureteral Dilation, left ureteroscopy, Right ureteroscopy with Stone Basketing, Bilateral Stent  insertion;  Surgeon: Carlos Lomeli MD;  [facility]     CYSTOSCOPY, URETEROSCOPY, COMBINED Right 11/9/2022    Procedure: CYSTOURETEROSCOPY;  Surgeon: Andrew Cruz  Curt Sellers MD;  [facility]     LASER REVOLIX LITHOTRIPSY URETER(S), INSERT STENT, COMBINED N/A 11/9/2022    Procedure: LITHOTRIPSY, CALCULUS, URETER, USING HOLMIUM LASER, WITH URETERAL STENT INSERTION;  Surgeon: Andrew Curz MD;  [facility]            Social History:     Social History     Tobacco Use     Smoking status: Never     Smokeless tobacco: Never   Substance Use Topics     Alcohol use: Never            Family History:   History reviewed. No pertinent family history.         Allergies:     Allergies   Allergen Reactions     Ibuprofen Nausea     Naproxen Nausea            Medications:     Current Facility-Administered Medications   Medication     0.9% sodium chloride BOLUS     lidocaine (LMX4) cream     lidocaine 1 % 0.1-1 mL     sodium chloride (PF) 0.9% PF flush 3 mL     sodium chloride (PF) 0.9% PF flush 3 mL     Current Outpatient Medications   Medication Sig     ketorolac (TORADOL) 10 MG tablet Take 1 tablet (10 mg) by mouth every 6 hours as needed for moderate pain (4-6)     ondansetron (ZOFRAN ODT) 4 MG ODT tab Take 1 tablet (4 mg) by mouth every 6 hours as needed for nausea     oxybutynin (DITROPAN) 5 MG tablet Take 2 tablets (10 mg) by mouth 3 times daily as needed for bladder spasms     acetaminophen (TYLENOL) 325 MG tablet Take 2 tablets (650 mg) by mouth every 6 hours as needed for mild pain     acetaminophen (TYLENOL) 500 MG tablet Take 1-2 tablets (500-1,000 mg) by mouth every 6 hours as needed for mild pain or fever     ciprofloxacin (CIPRO) 500 MG tablet Take 1 tablet (500 mg) by mouth 2 times daily for 7 days     dimenhyDRINATE (DRAMAMINE) 50 MG tablet Take 1 tablet (50 mg) by mouth nightly as needed for sleep     HYDROcodone-acetaminophen (NORCO) 5-325 MG tablet Take 1 tablet by mouth every 6 hours as needed for breakthrough pain     ibuprofen (ADVIL/MOTRIN) 600 MG tablet Take 1 tablet (600 mg) by mouth every 6 hours as needed for moderate pain (4-6)      naproxen (NAPROSYN) 500 MG tablet Take 1 tablet (500 mg) by mouth 2 times daily as needed for moderate pain (4-6) Take with meals.     SENNA-docusate sodium (SENNA S) 8.6-50 MG tablet Take 1 tablet by mouth 2 times daily     tamsulosin (FLOMAX) 0.4 MG capsule Take 1 capsule (0.4 mg) by mouth daily             Review of Systems:    ROS: See HPI for pertinent details.  Remainder of 10-point ROS negative.  As above in HPI         Physical Exam:   VS:  T: 97.6    HR: 88    BP: 123/71    RR: 16   GEN:  AOx3.  NAD.  Pleasant.  HEENT:  Sclerae anicteric.  Conjunctivae pink.  Moist mucous membranes  SKIN:  Warm.  Dry.  No rashes.  NEURO:  CN grossly intact.  Normal gait          Data:   All laboratory data reviewed:    No results found for: PSA  Recent Labs   Lab 11/22/22  1345 11/19/22  1045   WBC 9.5 11.0   HGB 12.2 12.2    263       Recent Labs   Lab 11/22/22  1618 11/22/22  1345 11/19/22  1045 11/16/22  0618   NA  --  140 139  --    POTASSIUM  --  4.0 3.7  --    CHLORIDE  --  107 106  --    CO2  --  21* 21*  --    BUN  --  12.4 15.2  --    CR 0.7 0.84 0.91  --    GLC  --  79 107* 100*   JOLIE  --  9.1 9.5  --      Recent Labs   Lab 11/19/22  1026   COLOR Orange*   APPEARANCE Slightly Cloudy*   URINEGLC Negative   URINEBILI Negative   URINEKETONE Trace*   SG 1.018   URINEPH 6.0   PROTEIN 100*   NITRITE Negative   LEUKEST Large*   RBCU >182*   WBCU >182*     Results for orders placed or performed during the hospital encounter of 11/19/22   Urine Culture    Specimen: Urine, Midstream   Result Value Ref Range    Culture No Growth    Results for orders placed or performed during the hospital encounter of 11/14/22   Urine Culture    Specimen: Urine, Clean Catch   Result Value Ref Range    Culture No Growth        All pertinent imaging reviewed:  No new imaging         Impression and Plan:   Impression / Plan:   Regan Gibson is a 22 year old adult who has gross hematuria as well as a likely persistent left ureteral  stone requiring treatment.  She also has ureteral stents on both sides.      She is symptomatic with L>R flank pain, nausea.  Reassuringly her CBC shows no leukocytosis and her hgb is stable.  Her serum creatinine and BMP also remain normal range.  UA is dirty, but this is typical in stented patients. Urine culture is pending but no concern for complicated UTI today.     Unfortunately, although she does require stone treatment and her stents are not permanent and will eventually require removal or replacement, she is not in need of an emergent procedure today.      Instead she presents with an insurance issue.  Per discussion with ROSEMARY, her insurance may not cover surgeries.  Along with ROSEMARY, I met with the patient to discuss and encouraged her to reach out directly to her insurance company for further details.  Also to reach out to [employer] finance to discuss options for treatment.  On our end, Dr. Lomeli will also discuss this patient with department leads to look for a creative solution for Dana.          Urology will sign off    Discussed with Dr. Lomeli, Dr. Nettles and ED staff.     Thank you for the opportunity to participate in the care of Regan Gibson.     Luz Marina Girard PA-C  Urology Physician Assistant  Personal Pager: [phone]    Please call Job Code:   [phone] to reach the Urology resident or PA on call - Weekdays  [phone] to reach the Urology resident or PA on call - Weeknights and weekends

## 2022-11-22 NOTE — TELEPHONE ENCOUNTER
[facility] Call Center    Phone Message    May a detailed message be left on voicemail: yes     Reason for Call: Carri is calling because they got notice that Dr. Nettles noted that the procedure tomorrow is not urgent and patient is concerned that the cost will more due to this and insurance will request to postpone procedure. Please reach out out Carri to discuss. Thank you    Action Taken: Message routed to:  [facility] Uro    Travel Screening: Not Applicable

## 2022-11-22 NOTE — DISCHARGE INSTRUCTIONS
Home.  Labs appear good.  You were evaluated by urology also who felt no need for antibiotics.  Call the Urology Clinic to set up next surgery time.  Toradol for pain.  Zofran for nausea.  Return if severe pain or fever or chills.    Please make an appointment to follow up with Urology Clinic (phone: [phone]) as soon as possible.

## 2022-11-23 ENCOUNTER — TELEPHONE (OUTPATIENT)
Dept: UROLOGY | Facility: CLINIC | Age: 22
End: 2022-11-23

## 2022-11-23 NOTE — TELEPHONE ENCOUNTER
Spoke with: Patient       Date of surgery: Thursday Dec 1 2022      Location: [facility]      Informed patient they will need a adult : YES      Pre op with provider: Recent surgery H&P 11-15       H&P Scheduled in PAC- NA         Pre procedure covid : Pt knows to do a home covid test 1-2 days prior take photo and bring in DOS      Additional imaging: NA        Surgery Packet : Forward to Tatiana JACOBSON      Additional comments:Please call with surgery teaching

## 2022-11-23 NOTE — TELEPHONE ENCOUNTER
[facility] Call Center    Phone Message    May a detailed message be left on voicemail: yes     Reason for Call: Other: . Patient was scheduled for surgery on 11/23/22 and was told by the billing office of the hospital stated that this was a non emergent procedure and canceled the surgery. Patient did end up going to ED at [facility] on 11/22/22. Per patient would like to schedule surgery preferably with Dr. Lomeli, would schedule with Dr. Nettles but would need explanation as to why the surgery was marked non emergency. Please reach out to patient.    Action Taken: Message routed to:  [facility] Urology    Travel Screening: Not Applicable

## 2022-11-23 NOTE — RESULT ENCOUNTER NOTE
[facility] Emergency Dept discharge antibiotic (if prescribed): None  No changes in treatment per [facility] ED Lab Result Urine culture protocol.

## 2022-11-24 LAB — BACTERIA UR CULT: NO GROWTH

## 2022-11-24 NOTE — RESULT ENCOUNTER NOTE
"Final urine culture report shows \"NO GROWTH\" and is NEGATIVE.  [facility] Emergency Dept discharge antibiotic: None  Recommendations in treatment per [facility] ED Lab result Urine culture protocol.  "

## 2022-11-25 NOTE — TELEPHONE ENCOUNTER
Writer called patient for surgery teaching and gave details including COVID prep, HP and no UC needed    No further questions from patient but she will reach out if has any prior to surgery and has number to contact us.      Nina Cuellar, RNCC Urology  [phone]

## 2022-11-30 ENCOUNTER — ANESTHESIA EVENT (OUTPATIENT)
Dept: SURGERY | Facility: CLINIC | Age: 22
End: 2022-11-30

## 2022-11-30 NOTE — ANESTHESIA PREPROCEDURE EVALUATION
Anesthesia Pre-Procedure Evaluation    Patient: Regan Gibson   MRN: [id number] : 2000        Procedure : Procedure(s):  Cystoscopy, Ureteroscopy, Holmium Laser Lithotripsy, Stent Placement, Bilateral          History reviewed. No pertinent past medical history.   Past Surgical History:   Procedure Laterality Date     CYSTOSCOPY, RETROGRADES, EXTRACT STONE, INSERT STENT, COMBINED Bilateral 2022    Procedure: Cystoscopy, bilateral retrograde Pyelogram, Left Ureteral Dilation, left ureteroscopy, Right ureteroscopy with Stone Basketing, Bilateral Stent  insertion;  Surgeon: Carlos Lomeli MD;  [facility]     CYSTOSCOPY, URETEROSCOPY, COMBINED Right 2022    Procedure: CYSTOURETEROSCOPY;  Surgeon: Andrew Cruz MD;  [facility]     LASER REVOLIX LITHOTRIPSY URETER(S), INSERT STENT, COMBINED N/A 2022    Procedure: LITHOTRIPSY, CALCULUS, URETER, USING HOLMIUM LASER, WITH URETERAL STENT INSERTION;  Surgeon: Andrew Cruz MD;  [facility]      Allergies   Allergen Reactions     Ibuprofen Nausea     Naproxen Nausea      Social History     Tobacco Use     Smoking status: Never     Smokeless tobacco: Never   Substance Use Topics     Alcohol use: Never      Wt Readings from Last 1 Encounters:   22 109.7 kg (241 lb 13.5 oz)        Anesthesia Evaluation   Pt has had prior anesthetic. Type: General.    No history of anesthetic complications       ROS/MED HX  ENT/Pulmonary:  - neg pulmonary ROS     Neurologic:  - neg neurologic ROS     Cardiovascular:  - neg cardiovascular ROS     METS/Exercise Tolerance:     Hematologic:  - neg hematologic  ROS     Musculoskeletal:  - neg musculoskeletal ROS     GI/Hepatic:     (+) GERD, Asymptomatic on medication,     Renal/Genitourinary:     (+) renal disease, Pt does not require dialysis, Nephrolithiasis ,     Endo:  - neg endo ROS     Psychiatric/Substance Use:  - neg psychiatric ROS     Infectious Disease:  - neg  infectious disease ROS     Malignancy:  - neg malignancy ROS     Other:  - neg other ROS          Physical Exam    Airway      Comment: Smaller mouth    Mallampati: III   TM distance: > 3 FB   Neck ROM: full   Mouth opening: > 3 cm    Respiratory Devices and Support         Dental  no notable dental history         Cardiovascular   cardiovascular exam normal          Pulmonary   pulmonary exam normal                OUTSIDE LABS:  CBC:   Lab Results   Component Value Date    WBC 9.5 11/22/2022    WBC 11.0 11/19/2022    HGB 12.2 11/22/2022    HGB 12.2 11/19/2022    HCT 36.9 11/22/2022    HCT 37.2 11/19/2022     11/22/2022     11/19/2022     BMP:   Lab Results   Component Value Date     11/22/2022     11/19/2022    POTASSIUM 4.0 11/22/2022    POTASSIUM 3.7 11/19/2022    CHLORIDE 107 11/22/2022    CHLORIDE 106 11/19/2022    CO2 21 (L) 11/22/2022    CO2 21 (L) 11/19/2022    BUN 12.4 11/22/2022    BUN 15.2 11/19/2022    CR 0.7 11/22/2022    CR 0.84 11/22/2022    GLC 79 11/22/2022     (H) 11/19/2022     COAGS:   Lab Results   Component Value Date    INR 1.00 11/22/2022     POC: No results found for: BGM, HCG, HCGS  HEPATIC:   Lab Results   Component Value Date    ALBUMIN 4.2 11/22/2022    PROTTOTAL 7.0 11/22/2022    ALT 20 11/22/2022    AST 18 11/22/2022    ALKPHOS 49 11/22/2022    BILITOTAL 0.2 11/22/2022     OTHER:   Lab Results   Component Value Date    LACT 1.2 11/19/2022    JOLIE 9.1 11/22/2022    LIPASE 69 (H) 11/02/2022       Anesthesia Plan    ASA Status:  2   NPO Status:  NPO Appropriate    Anesthesia Type: General.     - Airway: LMA   Induction: Intravenous, Propofol.   Maintenance: TIVA.        Consents    Anesthesia Plan(s) and associated risks, benefits, and realistic alternatives discussed. Questions answered and patient/representative(s) expressed understanding.    - Discussed:     - Discussed with:  Patient      - Extended Intubation/Ventilatory Support Discussed: No.      -  Patient is DNR/DNI Status: No    Use of blood products discussed: No .     Postoperative Care    Pain management: IV analgesics, Oral pain medications, Multi-modal analgesia.   PONV prophylaxis: Dexamethasone or Solumedrol, Ondansetron (or other 5HT-3), Background Propofol Infusion, Scopolamine patch     Comments:    Other Comments: History of PONV    Plan to add tiva and scopolamine patch    LMA candidate       H&P reviewed: Unable to attach H&P to encounter due to EHR limitations. H&P Update: appropriate H&P reviewed, patient examined, interval changes since H&P (within 30 days) include:         Jack Elam MD   Alert and oriented to person, place and time

## 2022-12-01 ENCOUNTER — APPOINTMENT (OUTPATIENT)
Dept: GENERAL RADIOLOGY | Facility: CLINIC | Age: 22
End: 2022-12-01
Attending: UROLOGY

## 2022-12-01 ENCOUNTER — HOSPITAL ENCOUNTER (OUTPATIENT)
Facility: CLINIC | Age: 22
Discharge: HOME | End: 2022-12-01
Attending: UROLOGY | Admitting: UROLOGY

## 2022-12-01 ENCOUNTER — ANESTHESIA (OUTPATIENT)
Dept: SURGERY | Facility: CLINIC | Age: 22
End: 2022-12-01

## 2022-12-01 VITALS
BODY MASS INDEX: 32.76 KG/M2 | SYSTOLIC BLOOD PRESSURE: 118 MMHG | TEMPERATURE: 97.9 F | WEIGHT: 241.84 LBS | HEART RATE: 104 BPM | OXYGEN SATURATION: 100 % | DIASTOLIC BLOOD PRESSURE: 70 MMHG | RESPIRATION RATE: 12 BRPM | HEIGHT: 72 IN

## 2022-12-01 DIAGNOSIS — N20.0 NEPHROLITHIASIS: Primary | ICD-10-CM

## 2022-12-01 LAB — GLUCOSE BLDC GLUCOMTR-MCNC: 92 MG/DL (ref 70–99)

## 2022-12-01 PROCEDURE — 360N000083 HC SURGERY LEVEL 3 W/ FLUORO, PER MIN: Performed by: UROLOGY

## 2022-12-01 PROCEDURE — 370N000017 HC ANESTHESIA TECHNICAL FEE, PER MIN: Performed by: UROLOGY

## 2022-12-01 PROCEDURE — 82962 GLUCOSE BLOOD TEST: CPT

## 2022-12-01 PROCEDURE — 250N000009 HC RX 250: Performed by: NURSE ANESTHETIST, CERTIFIED REGISTERED

## 2022-12-01 PROCEDURE — 258N000003 HC RX IP 258 OP 636: Performed by: STUDENT IN AN ORGANIZED HEALTH CARE EDUCATION/TRAINING PROGRAM

## 2022-12-01 PROCEDURE — 250N000025 HC SEVOFLURANE, PER MIN: Performed by: UROLOGY

## 2022-12-01 PROCEDURE — 52332 CYSTOSCOPY AND TREATMENT: CPT | Mod: LT | Performed by: UROLOGY

## 2022-12-01 PROCEDURE — 250N000013 HC RX MED GY IP 250 OP 250 PS 637: Performed by: ANESTHESIOLOGY

## 2022-12-01 PROCEDURE — C2617 STENT, NON-COR, TEM W/O DEL: HCPCS | Performed by: UROLOGY

## 2022-12-01 PROCEDURE — 250N000011 HC RX IP 250 OP 636: Performed by: NURSE ANESTHETIST, CERTIFIED REGISTERED

## 2022-12-01 PROCEDURE — 250N000011 HC RX IP 250 OP 636: Performed by: UROLOGY

## 2022-12-01 PROCEDURE — 999N000141 HC STATISTIC PRE-PROCEDURE NURSING ASSESSMENT: Performed by: UROLOGY

## 2022-12-01 PROCEDURE — 999N000180 XR SURGERY CARM FLUORO LESS THAN 5 MIN: Mod: TC

## 2022-12-01 PROCEDURE — 52352 CYSTOURETERO W/STONE REMOVE: CPT | Mod: LT | Performed by: UROLOGY

## 2022-12-01 PROCEDURE — 258N000003 HC RX IP 258 OP 636: Performed by: NURSE ANESTHETIST, CERTIFIED REGISTERED

## 2022-12-01 PROCEDURE — 710N000010 HC RECOVERY PHASE 1, LEVEL 2, PER MIN: Performed by: UROLOGY

## 2022-12-01 PROCEDURE — C1769 GUIDE WIRE: HCPCS | Performed by: UROLOGY

## 2022-12-01 PROCEDURE — 710N000012 HC RECOVERY PHASE 2, PER MINUTE: Performed by: UROLOGY

## 2022-12-01 PROCEDURE — 82365 CALCULUS SPECTROSCOPY: CPT | Performed by: UROLOGY

## 2022-12-01 PROCEDURE — 250N000009 HC RX 250: Performed by: STUDENT IN AN ORGANIZED HEALTH CARE EDUCATION/TRAINING PROGRAM

## 2022-12-01 PROCEDURE — 250N000013 HC RX MED GY IP 250 OP 250 PS 637: Performed by: STUDENT IN AN ORGANIZED HEALTH CARE EDUCATION/TRAINING PROGRAM

## 2022-12-01 PROCEDURE — 360N000082 HC SURGERY LEVEL 2 W/ FLUORO, PER MIN: Performed by: UROLOGY

## 2022-12-01 PROCEDURE — 74420 UROGRAPHY RTRGR +-KUB: CPT | Mod: 26 | Performed by: UROLOGY

## 2022-12-01 PROCEDURE — 272N000001 HC OR GENERAL SUPPLY STERILE: Performed by: UROLOGY

## 2022-12-01 DEVICE — IMPLANTABLE DEVICE: Type: IMPLANTABLE DEVICE | Site: KIDNEY | Status: FUNCTIONAL

## 2022-12-01 RX ORDER — LORAZEPAM 2 MG/ML
.5-1 INJECTION INTRAMUSCULAR
Status: DISCONTINUED | OUTPATIENT
Start: 2022-12-01 | End: 2022-12-01 | Stop reason: HOSPADM

## 2022-12-01 RX ORDER — HYDROCODONE BITARTRATE AND ACETAMINOPHEN 5; 325 MG/1; MG/1
1 TABLET ORAL EVERY 6 HOURS PRN
Qty: 6 TABLET | Refills: 0 | Status: SHIPPED | OUTPATIENT
Start: 2022-12-01 | End: 2022-12-04

## 2022-12-01 RX ORDER — LIDOCAINE HYDROCHLORIDE 20 MG/ML
INJECTION, SOLUTION INFILTRATION; PERINEURAL PRN
Status: DISCONTINUED | OUTPATIENT
Start: 2022-12-01 | End: 2022-12-01

## 2022-12-01 RX ORDER — SODIUM CHLORIDE 9 MG/ML
INJECTION, SOLUTION INTRAVENOUS CONTINUOUS PRN
Status: DISCONTINUED | OUTPATIENT
Start: 2022-12-01 | End: 2022-12-01

## 2022-12-01 RX ORDER — ONDANSETRON 4 MG/1
4 TABLET, ORALLY DISINTEGRATING ORAL EVERY 30 MIN PRN
Status: DISCONTINUED | OUTPATIENT
Start: 2022-12-01 | End: 2022-12-01 | Stop reason: HOSPADM

## 2022-12-01 RX ORDER — PROPOFOL 10 MG/ML
INJECTION, EMULSION INTRAVENOUS PRN
Status: DISCONTINUED | OUTPATIENT
Start: 2022-12-01 | End: 2022-12-01

## 2022-12-01 RX ORDER — CEFAZOLIN SODIUM/WATER 2 G/20 ML
2 SYRINGE (ML) INTRAVENOUS
Status: COMPLETED | OUTPATIENT
Start: 2022-12-01 | End: 2022-12-01

## 2022-12-01 RX ORDER — SODIUM CHLORIDE, SODIUM LACTATE, POTASSIUM CHLORIDE, CALCIUM CHLORIDE 600; 310; 30; 20 MG/100ML; MG/100ML; MG/100ML; MG/100ML
INJECTION, SOLUTION INTRAVENOUS CONTINUOUS
Status: DISCONTINUED | OUTPATIENT
Start: 2022-12-01 | End: 2022-12-01 | Stop reason: HOSPADM

## 2022-12-01 RX ORDER — ALBUTEROL SULFATE 0.83 MG/ML
2.5 SOLUTION RESPIRATORY (INHALATION) EVERY 4 HOURS PRN
Status: DISCONTINUED | OUTPATIENT
Start: 2022-12-01 | End: 2022-12-01 | Stop reason: HOSPADM

## 2022-12-01 RX ORDER — CEFAZOLIN SODIUM/WATER 2 G/20 ML
2 SYRINGE (ML) INTRAVENOUS SEE ADMIN INSTRUCTIONS
Status: DISCONTINUED | OUTPATIENT
Start: 2022-12-01 | End: 2022-12-01 | Stop reason: HOSPADM

## 2022-12-01 RX ORDER — HYDRALAZINE HYDROCHLORIDE 20 MG/ML
2.5-5 INJECTION INTRAMUSCULAR; INTRAVENOUS EVERY 10 MIN PRN
Status: DISCONTINUED | OUTPATIENT
Start: 2022-12-01 | End: 2022-12-01 | Stop reason: HOSPADM

## 2022-12-01 RX ORDER — MEPERIDINE HYDROCHLORIDE 25 MG/ML
12.5 INJECTION INTRAMUSCULAR; INTRAVENOUS; SUBCUTANEOUS
Status: DISCONTINUED | OUTPATIENT
Start: 2022-12-01 | End: 2022-12-01 | Stop reason: HOSPADM

## 2022-12-01 RX ORDER — DEXMEDETOMIDINE HYDROCHLORIDE 4 UG/ML
INJECTION, SOLUTION INTRAVENOUS PRN
Status: DISCONTINUED | OUTPATIENT
Start: 2022-12-01 | End: 2022-12-01

## 2022-12-01 RX ORDER — FENTANYL CITRATE 50 UG/ML
50 INJECTION, SOLUTION INTRAMUSCULAR; INTRAVENOUS EVERY 5 MIN PRN
Status: DISCONTINUED | OUTPATIENT
Start: 2022-12-01 | End: 2022-12-01 | Stop reason: HOSPADM

## 2022-12-01 RX ORDER — FENTANYL CITRATE 50 UG/ML
INJECTION, SOLUTION INTRAMUSCULAR; INTRAVENOUS PRN
Status: DISCONTINUED | OUTPATIENT
Start: 2022-12-01 | End: 2022-12-01

## 2022-12-01 RX ORDER — ONDANSETRON 2 MG/ML
4 INJECTION INTRAMUSCULAR; INTRAVENOUS EVERY 30 MIN PRN
Status: DISCONTINUED | OUTPATIENT
Start: 2022-12-01 | End: 2022-12-01 | Stop reason: HOSPADM

## 2022-12-01 RX ORDER — ACETAMINOPHEN 325 MG/1
975 TABLET ORAL ONCE
Status: COMPLETED | OUTPATIENT
Start: 2022-12-01 | End: 2022-12-01

## 2022-12-01 RX ORDER — DEXAMETHASONE SODIUM PHOSPHATE 4 MG/ML
INJECTION, SOLUTION INTRA-ARTICULAR; INTRALESIONAL; INTRAMUSCULAR; INTRAVENOUS; SOFT TISSUE PRN
Status: DISCONTINUED | OUTPATIENT
Start: 2022-12-01 | End: 2022-12-01

## 2022-12-01 RX ORDER — FENTANYL CITRATE 50 UG/ML
25 INJECTION, SOLUTION INTRAMUSCULAR; INTRAVENOUS
Status: DISCONTINUED | OUTPATIENT
Start: 2022-12-01 | End: 2022-12-01 | Stop reason: HOSPADM

## 2022-12-01 RX ORDER — HYDROMORPHONE HYDROCHLORIDE 1 MG/ML
0.4 INJECTION, SOLUTION INTRAMUSCULAR; INTRAVENOUS; SUBCUTANEOUS EVERY 5 MIN PRN
Status: DISCONTINUED | OUTPATIENT
Start: 2022-12-01 | End: 2022-12-01 | Stop reason: HOSPADM

## 2022-12-01 RX ORDER — FENTANYL CITRATE 50 UG/ML
25 INJECTION, SOLUTION INTRAMUSCULAR; INTRAVENOUS EVERY 5 MIN PRN
Status: DISCONTINUED | OUTPATIENT
Start: 2022-12-01 | End: 2022-12-01 | Stop reason: HOSPADM

## 2022-12-01 RX ORDER — HYDROMORPHONE HYDROCHLORIDE 1 MG/ML
0.2 INJECTION, SOLUTION INTRAMUSCULAR; INTRAVENOUS; SUBCUTANEOUS EVERY 5 MIN PRN
Status: DISCONTINUED | OUTPATIENT
Start: 2022-12-01 | End: 2022-12-01 | Stop reason: HOSPADM

## 2022-12-01 RX ORDER — LIDOCAINE 40 MG/G
CREAM TOPICAL
Status: DISCONTINUED | OUTPATIENT
Start: 2022-12-01 | End: 2022-12-01 | Stop reason: HOSPADM

## 2022-12-01 RX ORDER — PROPOFOL 10 MG/ML
INJECTION, EMULSION INTRAVENOUS CONTINUOUS PRN
Status: DISCONTINUED | OUTPATIENT
Start: 2022-12-01 | End: 2022-12-01

## 2022-12-01 RX ORDER — SCOLOPAMINE TRANSDERMAL SYSTEM 1 MG/1
1 PATCH, EXTENDED RELEASE TRANSDERMAL ONCE
Status: DISCONTINUED | OUTPATIENT
Start: 2022-12-01 | End: 2022-12-01 | Stop reason: HOSPADM

## 2022-12-01 RX ORDER — ONDANSETRON 2 MG/ML
INJECTION INTRAMUSCULAR; INTRAVENOUS PRN
Status: DISCONTINUED | OUTPATIENT
Start: 2022-12-01 | End: 2022-12-01

## 2022-12-01 RX ADMIN — PROPOFOL 50 MCG/KG/MIN: 10 INJECTION, EMULSION INTRAVENOUS at 15:57

## 2022-12-01 RX ADMIN — ONDANSETRON 4 MG: 2 INJECTION INTRAMUSCULAR; INTRAVENOUS at 15:10

## 2022-12-01 RX ADMIN — ACETAMINOPHEN 975 MG: 325 TABLET, FILM COATED ORAL at 11:52

## 2022-12-01 RX ADMIN — MIDAZOLAM 2 MG: 1 INJECTION INTRAMUSCULAR; INTRAVENOUS at 15:00

## 2022-12-01 RX ADMIN — DEXMEDETOMIDINE 12 MCG: 100 INJECTION, SOLUTION, CONCENTRATE INTRAVENOUS at 15:20

## 2022-12-01 RX ADMIN — DEXMEDETOMIDINE 8 MCG: 100 INJECTION, SOLUTION, CONCENTRATE INTRAVENOUS at 15:43

## 2022-12-01 RX ADMIN — FENTANYL CITRATE 50 MCG: 50 INJECTION, SOLUTION INTRAMUSCULAR; INTRAVENOUS at 15:11

## 2022-12-01 RX ADMIN — SODIUM CHLORIDE: 9 INJECTION, SOLUTION INTRAVENOUS at 14:59

## 2022-12-01 RX ADMIN — FENTANYL CITRATE 50 MCG: 50 INJECTION, SOLUTION INTRAMUSCULAR; INTRAVENOUS at 15:13

## 2022-12-01 RX ADMIN — PROPOFOL 150 MCG/KG/MIN: 10 INJECTION, EMULSION INTRAVENOUS at 15:13

## 2022-12-01 RX ADMIN — SODIUM CHLORIDE, POTASSIUM CHLORIDE, SODIUM LACTATE AND CALCIUM CHLORIDE: 600; 310; 30; 20 INJECTION, SOLUTION INTRAVENOUS at 16:02

## 2022-12-01 RX ADMIN — Medication 2 G: at 15:15

## 2022-12-01 RX ADMIN — PROPOFOL 300 MG: 10 INJECTION, EMULSION INTRAVENOUS at 15:10

## 2022-12-01 RX ADMIN — LIDOCAINE HYDROCHLORIDE 100 MG: 20 INJECTION, SOLUTION INFILTRATION; PERINEURAL at 15:10

## 2022-12-01 RX ADMIN — SCOPALAMINE 1 PATCH: 1 PATCH, EXTENDED RELEASE TRANSDERMAL at 14:14

## 2022-12-01 RX ADMIN — Medication 1 LOZENGE: at 17:35

## 2022-12-01 RX ADMIN — DEXAMETHASONE SODIUM PHOSPHATE 8 MG: 4 INJECTION, SOLUTION INTRA-ARTICULAR; INTRALESIONAL; INTRAMUSCULAR; INTRAVENOUS; SOFT TISSUE at 15:10

## 2022-12-01 ASSESSMENT — ACTIVITIES OF DAILY LIVING (ADL)
ADLS_ACUITY_SCORE: 35
ADLS_ACUITY_SCORE: 33

## 2022-12-01 NOTE — ANESTHESIA CARE TRANSFER NOTE
Patient: Regan Gibson    Procedure: Procedure(s):  Cystoscopy, Left Ureteroscopy with Stone basket extraction, Left Stent Placement, Right stent removal       Diagnosis: Nephrolithiasis [N20.0]  Diagnosis Additional Information: No value filed.    Anesthesia Type:   General     Note:      Level of Consciousness: awake  Oxygen Supplementation: room air    Independent Airway: airway patency satisfactory and stable        Patient transferred to: PACU    Handoff Report: Identifed the Patient, Identified the Reponsible Provider, Reviewed the pertinent medical history, Discussed the surgical course, Reviewed Intra-OP anesthesia mangement and issues during anesthesia, Set expectations for post-procedure period and Allowed opportunity for questions and acknowledgement of understanding      Vitals:  Vitals Value Taken Time   /64 12/01/22 1700   Temp     Pulse 89 12/01/22 1700   Resp 18 12/01/22 1700   SpO2 98 % 12/01/22 1700       Electronically Signed By: Len Fairchild MD  December 1, 2022  4:24 PM

## 2022-12-01 NOTE — BRIEF OP NOTE
[facility]    Brief Operative Note    Pre-operative diagnosis: Nephrolithiasis [N20.0]  Post-operative diagnosis Same as pre-operative diagnosis    Procedure: Procedure(s):  Cystoscopy, Left Ureteroscopy with Stone basket extraction, Left Stent Placement, Right stent removal  Surgeon: Surgeon(s) and Role:     * Ankit Nettles MD - Primary     * Adrien Wilkes MD - Resident - Assisting     * Selena Conde MD - Fellow - Assisting  Anesthesia: Choice   Estimated Blood Loss: Minimal    Drains: 5 fr x 26 cm ureteral stent   Specimens:   ID Type Source Tests Collected by Time Destination   A :  Calculus/Stone Kidney, Left STONE ANALYSIS Ankit Nettles MD 12/1/2022  3:57 PM      Findings:   Stone free in the left kidney .  Complications: None.  Implants:   Implant Name Type Inv. Item Serial No.  Lot No. LRB No. Used Action   STENT URETERAL SOFT UNIVERSA [id number] - [id number] Stent STENT URETERAL SOFT UNIVERSA [id number]  [facility] [id number] Left 1 Implanted

## 2022-12-01 NOTE — ANESTHESIA PROCEDURE NOTES
Airway       Patient location during procedure: OR  Staff -        Anesthesiologist:  Suman Banuelos MD       Performed By: anesthesiologist  Consent for Airway        Urgency: elective  Indications and Patient Condition       Indications for airway management: benitez-procedural       Induction type:intravenous       Mask difficulty assessment: 0 - not attempted    Final Airway Details       Final airway type: supraglottic airway    Supraglottic Airway Details        Type: LMA       Brand: Air-Q       LMA size: 4.5    Post intubation assessment        Placement verified by: capnometry, equal breath sounds and chest rise        Number of attempts at approach: 1       Number of other approaches attempted: 0       Secured with: silk tape       Ease of procedure: easy       Dentition: Intact and Unchanged

## 2022-12-01 NOTE — DISCHARGE INSTRUCTIONS
Now stone free, may remove stent on 2022    Same-Day Surgery   Adult Discharge Orders & Instructions     For 24 hours after surgery:  Get plenty of rest.  A responsible adult must stay with you for at least 24 hours after you leave the hospital.   Pain medication can slow your reflexes. Do not drive or use heavy equipment.  If you have weakness or tingling, don't drive or use heavy equipment until this feeling goes away.  Mixing alcohol and pain medication can cause dizziness and slow your breathing. It can even be fatal. Do not drink alcohol while taking pain medication.  Avoid strenuous or risky activities.  Ask for help when climbing stairs.   You may feel lightheaded.  If so, sit for a few minutes before standing.  Have someone help you get up.   If you have nausea (feel sick to your stomach), drink only clear liquids such as apple juice, ginger ale, broth or 7-Up.  Rest may also help.  Be sure to drink enough fluids.  Move to a regular diet as you feel able. Take pain medications with a small amount of solid food, such as toast or crackers, to avoid nausea.   A slight fever is normal. Call the doctor if your fever is over 100 F (37.7 C) (taken under the tongue) or lasts longer than 24 hours.  You may have a dry mouth, muscle aches, trouble sleeping or a sore throat.  These symptoms should go away after 24 hours.  Do not make important or legal decisions.   Pain Management:      1. Take pain medication (if prescribed) for pain as directed by your physician.        2. WARNING: If the pain medication you have been prescribed contains Tylenol  (acetaminophen), DO NOT take additional doses of Tylenol (acetaminophen).     Call your doctor for any of the followin.  Signs of infection (fever, growing tenderness at the surgery site, severe pain, a large amount of drainage or bleeding, foul-smelling drainage, redness, swelling).    2.  It has been over 8 to 10 hours since surgery and you are still not  able to urinate (pee).    3.  Headache for over 24 hours.    4.  Numbness, tingling or weakness the day after surgery (if you had spinal anesthesia).  To contact a doctor, call Dr. Ankit Nettles at [phone] at the Urology Clinic from 8 am till 5 pm  or:  '   [phone] and ask for the Resident On Call for:        Urology  (answered 24 hours a day)  '   Emergency Department:  [place] [facility]: [phone]  [place] [facility]: [phone]               Rev. 10/2014

## 2022-12-01 NOTE — ANESTHESIA POSTPROCEDURE EVALUATION
Patient: Regan Gibson    Procedure: Procedure(s):  Cystoscopy, Left Ureteroscopy with Stone basket extraction, Left Stent Placement, Right stent removal       Anesthesia Type:  General    Note:  Disposition: Outpatient   Postop Pain Control: Uneventful            Sign Out: Well controlled pain   PONV: No   Neuro/Psych: Uneventful            Sign Out: Acceptable/Baseline neuro status   Airway/Respiratory: Uneventful            Sign Out: Acceptable/Baseline resp. status   CV/Hemodynamics: Uneventful            Sign Out: Acceptable CV status; No obvious hypovolemia; No obvious fluid overload   Other NRE:    DID A NON-ROUTINE EVENT OCCUR?            Last vitals:  Vitals Value Taken Time   /64 12/01/22 1700   Temp     Pulse 89 12/01/22 1700   Resp 18 12/01/22 1700   SpO2 98 % 12/01/22 1700       Electronically Signed By: Len Fairchild MD  December 1, 2022  5:23 PM

## 2022-12-01 NOTE — OP NOTE
OPERATIVE REPORT    PREOPERATIVE DIAGNOSIS:  Nephrolithiasis     POSTOPERATIVE DIAGNOSIS:  Same    PROCEDURES PERFORMED:   -Cystourethroscopy  -Left ureteroscopy  -Left retrograde pyelogram with intraoperative interpretation of images  -Left basket stone retrieval  -Left ureteral stent placement  -Right stent removal     STAFF SURGEON: Surgeon(s):  Ankit Nettles MD  RESIDENT(S):   Adrien Wilkes MD Ganesan, Vishnuvardhan, MD    ANESTHESIA: General    ESTIMATED BLOOD LOSS: 5 mL     DRAINS/TUBES: 5 [place] x 26 cm double-J ureteral stent    IV FLUIDS: Please see dictated anesthesia record  COMPLICATIONS: None.   SPECIMEN:   ID Type Source Tests Collected by Time Destination   A :  Calculus/Stone Kidney, Left STONE ANALYSIS Ankit Nettles MD 12/1/2022  3:57 PM        SIGNIFICANT FINDINGS:   1. Patient is stone free in left upper tract  2. Proximal curl of the ureteral stent seen in the renal pelvis under fluoroscopy and distal curl seen in the bladder under direct vision.     BRIEF OPERATIVE INDICATIONS: Regan Gibson is a(n) 22 year old adult who presented with obstructing stones as well as non-obstructing stones and is here for right stent removal and treatment of her remaining left renal stones.  After a discussion of all risks, benefits, and alternatives, the patient elected to proceed with definitive stone management. The patient understands the potential need for more than one procedure to eliminate all stone burden.     A 22-Argent[place] rigid cystoscope was inserted into a well-lubricated urethra. The urethra was unremarkable without stricture. The previous indwelling right ureteral stent was identified and removed with the flexible stent grasper and discarded. The left stent was grapsed in a similar manner to the level of the urethral meatus where it was cannulated with a sensor wire. Access was lost due to pulling out the stent too far so instead a flexible ureteroscope was advanced up the ureter  primarily to the renal pelvis. The ureter was notable for being somewhat narrow despite being pre-stented.The stones were located in the lower pole of the kidney and all were removed with the aid of a basket without the need for laser lithotripsy.     Pullback ureteroscopy was performed and showed no retained stone fragments or significant ureteral injury.    A 5Fr x 25-cm double-J stent was advanced over the Sensor wire, and a good proximal curl was seen in the renal pelvis on fluoroscopy and the distal curl was seen in the bladder. The bladder was drained.    The patient tolerated the procedure well and there were no apparent complications. The patient  was transported to the postanesthesia care unit in stable condition.     POSTOP PLAN:  - Now stone free, may remove stent on Monday 12/5/2022  - Follow up in 6 weeks with renal US and KUB prior     I, Ankit Nettles, was present and participatory for the entirety of the procedure

## 2022-12-02 DIAGNOSIS — N20.0 NEPHROLITHIASIS: Primary | ICD-10-CM

## 2022-12-05 ENCOUNTER — TELEPHONE (OUTPATIENT)
Dept: UROLOGY | Facility: CLINIC | Age: 22
End: 2022-12-05

## 2022-12-05 LAB
APPEARANCE STONE: NORMAL
COMPN STONE: NORMAL
SPECIMEN WT: 16 MG

## (undated) DEVICE — SUCTION MANIFOLD NEPTUNE 2 SYS 4 PORT 0702-020-000

## (undated) DEVICE — CATH URETERAL OPEN END 5FRX70CM M0064002010

## (undated) DEVICE — DRAPE C-ARM W/STRAPS 42X72" 07-CA104

## (undated) DEVICE — GUIDEWIRE SENSOR DUAL FLEX STR 0.035"X150CM M0066703080

## (undated) DEVICE — DILATATION SYSTEM URETERAL 6-16FR M0062401000

## (undated) DEVICE — Device

## (undated) DEVICE — PACK CYSTO UMMC CUSTOM

## (undated) DEVICE — SPECIMEN CONTAINER 5OZ STERILE 2600SA

## (undated) DEVICE — SOL WATER IRRIG 1000ML BOTTLE 2F7114

## (undated) DEVICE — LINEN GOWN X4 5410

## (undated) DEVICE — GLOVE PROTEXIS W/NEU-THERA 7.5  2D73TE75

## (undated) DEVICE — GOWN IMPERVIOUS SPECIALTY XLG/XLONG 32474

## (undated) DEVICE — LINEN TOWEL PACK X5 5464

## (undated) DEVICE — STRAP KNEE/BODY 31143004

## (undated) DEVICE — SOL NACL 0.9% IRRIG 3000ML BAG 2B7477

## (undated) DEVICE — SOL NACL 0.9% IRRIG 1000ML BOTTLE 2F7124

## (undated) DEVICE — TUBING SET THERMEDX UROLOGY SGL USE LL0006

## (undated) DEVICE — PAD CHUX UNDERPAD 30X36" P3036C

## (undated) DEVICE — PAD FLOOR SURGSAFE 30X40" 83030

## (undated) DEVICE — PREP DYNA-HEX 4% CHG SCRUB 4OZ BOTTLE MDS098710

## (undated) DEVICE — SHEATH URETERAL ACCESS NAVIGATOR HD 11/13FRX36CM M0062502220

## (undated) DEVICE — LASER FIBER HOLMIUM FLEXIVA PULSE ID 242 M006L8406910

## (undated) DEVICE — GLOVE BIOGEL PI MICRO SZ 7.5 48575

## (undated) DEVICE — ADAPTER SCOPE UROLOK II LF M0067301400

## (undated) DEVICE — CATH URETERAL DUAL LUMEN 10FRX54CM M0064051000

## (undated) DEVICE — BASKET STONE EXTRACTOR NITINOL NCIRCLE 1.5FRX115CM G46206

## (undated) DEVICE — BASKET NITINOL TIPLESS HALO  1.5FRX120CM 554120

## (undated) DEVICE — SUCTION WATERBUG 12FT FLR FLEX TUBE NS LF DISP A90030

## (undated) DEVICE — PAD CHUX UNDERPAD 23X24" 7136

## (undated) DEVICE — ENDO SEAL BX PORT BPS-A

## (undated) DEVICE — SYR 30ML LL W/O NDL 302832

## (undated) DEVICE — GLOVE BIOGEL PI MICRO SZ 7.0 48570

## (undated) DEVICE — BASKET STONE RETRIEVAL NTNL ZERO TIP 1.9FRX90CM M0063901050

## (undated) DEVICE — GUIDEWIRE AMPLATZ 0.035"X145CM  SUPER STIFF 640-104

## (undated) DEVICE — GOWN XLG DISP 9545

## (undated) DEVICE — SYR 50ML CATH TIP W/O NDL 309620

## (undated) DEVICE — CATH FOLEY 14FR 5ML SILVER COAT LATEX 0165SI14

## (undated) RX ORDER — SCOLOPAMINE TRANSDERMAL SYSTEM 1 MG/1
PATCH, EXTENDED RELEASE TRANSDERMAL
Status: DISPENSED
Start: 2022-12-01

## (undated) RX ORDER — CIPROFLOXACIN 500 MG/1
TABLET, FILM COATED ORAL
Status: DISPENSED
Start: 2022-11-14

## (undated) RX ORDER — LIDOCAINE HYDROCHLORIDE 20 MG/ML
INJECTION, SOLUTION EPIDURAL; INFILTRATION; INTRACAUDAL; PERINEURAL
Status: DISPENSED
Start: 2022-11-09

## (undated) RX ORDER — PROPOFOL 10 MG/ML
INJECTION, EMULSION INTRAVENOUS
Status: DISPENSED
Start: 2022-11-16

## (undated) RX ORDER — LIDOCAINE HYDROCHLORIDE 20 MG/ML
JELLY TOPICAL
Status: DISPENSED
Start: 2022-11-16

## (undated) RX ORDER — FENTANYL CITRATE-0.9 % NACL/PF 10 MCG/ML
PLASTIC BAG, INJECTION (ML) INTRAVENOUS
Status: DISPENSED
Start: 2022-11-16

## (undated) RX ORDER — HYDROMORPHONE HYDROCHLORIDE 1 MG/ML
INJECTION, SOLUTION INTRAMUSCULAR; INTRAVENOUS; SUBCUTANEOUS
Status: DISPENSED
Start: 2022-11-16

## (undated) RX ORDER — GLYCOPYRROLATE 0.2 MG/ML
INJECTION, SOLUTION INTRAMUSCULAR; INTRAVENOUS
Status: DISPENSED
Start: 2022-11-16

## (undated) RX ORDER — DEXAMETHASONE SODIUM PHOSPHATE 4 MG/ML
INJECTION, SOLUTION INTRA-ARTICULAR; INTRALESIONAL; INTRAMUSCULAR; INTRAVENOUS; SOFT TISSUE
Status: DISPENSED
Start: 2022-11-09

## (undated) RX ORDER — FENTANYL CITRATE 50 UG/ML
INJECTION, SOLUTION INTRAMUSCULAR; INTRAVENOUS
Status: DISPENSED
Start: 2022-12-01

## (undated) RX ORDER — ONDANSETRON 2 MG/ML
INJECTION INTRAMUSCULAR; INTRAVENOUS
Status: DISPENSED
Start: 2022-11-16

## (undated) RX ORDER — FENTANYL CITRATE 50 UG/ML
INJECTION, SOLUTION INTRAMUSCULAR; INTRAVENOUS
Status: DISPENSED
Start: 2022-11-09

## (undated) RX ORDER — FENTANYL CITRATE 50 UG/ML
INJECTION, SOLUTION INTRAMUSCULAR; INTRAVENOUS
Status: DISPENSED
Start: 2022-11-16

## (undated) RX ORDER — ACETAMINOPHEN 325 MG/1
TABLET ORAL
Status: DISPENSED
Start: 2022-12-01

## (undated) RX ORDER — ACETAMINOPHEN 325 MG/1
TABLET ORAL
Status: DISPENSED
Start: 2022-11-16

## (undated) RX ORDER — ONDANSETRON 2 MG/ML
INJECTION INTRAMUSCULAR; INTRAVENOUS
Status: DISPENSED
Start: 2022-11-09

## (undated) RX ORDER — CEFAZOLIN SODIUM/WATER 2 G/20 ML
SYRINGE (ML) INTRAVENOUS
Status: DISPENSED
Start: 2022-11-09

## (undated) RX ORDER — CEFAZOLIN SODIUM/WATER 2 G/20 ML
SYRINGE (ML) INTRAVENOUS
Status: DISPENSED
Start: 2022-12-01

## (undated) RX ORDER — LIDOCAINE HYDROCHLORIDE 20 MG/ML
INJECTION, SOLUTION EPIDURAL; INFILTRATION; INTRACAUDAL; PERINEURAL
Status: DISPENSED
Start: 2022-11-16

## (undated) RX ORDER — DEXAMETHASONE SODIUM PHOSPHATE 4 MG/ML
INJECTION, SOLUTION INTRA-ARTICULAR; INTRALESIONAL; INTRAMUSCULAR; INTRAVENOUS; SOFT TISSUE
Status: DISPENSED
Start: 2022-11-16

## (undated) RX ORDER — PROPOFOL 10 MG/ML
INJECTION, EMULSION INTRAVENOUS
Status: DISPENSED
Start: 2022-11-09

## (undated) RX ORDER — ACETAMINOPHEN 325 MG/1
TABLET ORAL
Status: DISPENSED
Start: 2022-11-09